# Patient Record
Sex: FEMALE | Race: WHITE | NOT HISPANIC OR LATINO | Employment: UNEMPLOYED | URBAN - METROPOLITAN AREA
[De-identification: names, ages, dates, MRNs, and addresses within clinical notes are randomized per-mention and may not be internally consistent; named-entity substitution may affect disease eponyms.]

---

## 2018-10-07 ENCOUNTER — HOSPITAL ENCOUNTER (EMERGENCY)
Facility: MEDICAL CENTER | Age: 23
End: 2018-10-07
Attending: EMERGENCY MEDICINE

## 2018-10-07 VITALS
SYSTOLIC BLOOD PRESSURE: 92 MMHG | TEMPERATURE: 97.6 F | DIASTOLIC BLOOD PRESSURE: 66 MMHG | WEIGHT: 102.51 LBS | RESPIRATION RATE: 16 BRPM | OXYGEN SATURATION: 98 % | HEART RATE: 68 BPM

## 2018-10-07 DIAGNOSIS — F41.0 PANIC ATTACK: ICD-10-CM

## 2018-10-07 PROCEDURE — 99284 EMERGENCY DEPT VISIT MOD MDM: CPT

## 2018-10-07 PROCEDURE — 96372 THER/PROPH/DIAG INJ SC/IM: CPT

## 2018-10-07 PROCEDURE — 700111 HCHG RX REV CODE 636 W/ 250 OVERRIDE (IP): Performed by: EMERGENCY MEDICINE

## 2018-10-07 RX ORDER — LORAZEPAM 2 MG/ML
1 INJECTION INTRAMUSCULAR
Status: DISCONTINUED | OUTPATIENT
Start: 2018-10-07 | End: 2018-10-07 | Stop reason: HOSPADM

## 2018-10-07 RX ORDER — LORAZEPAM 1 MG/1
1 TABLET ORAL EVERY 8 HOURS PRN
Qty: 15 TAB | Refills: 0 | Status: SHIPPED | OUTPATIENT
Start: 2018-10-07 | End: 2018-10-12

## 2018-10-07 RX ORDER — LORAZEPAM 2 MG/ML
1 INJECTION INTRAMUSCULAR ONCE
Status: DISCONTINUED | OUTPATIENT
Start: 2018-10-07 | End: 2018-10-07

## 2018-10-07 RX ADMIN — LORAZEPAM 1 MG: 2 INJECTION INTRAMUSCULAR; INTRAVENOUS at 10:36

## 2018-10-07 NOTE — ED NOTES
Pt verbalizes understanding of discharge and follow-up instructions. Given Rx X1.  VSS.  All questions answered.  Ambulates to discharge with steady gait.

## 2018-10-07 NOTE — ED NOTES
"Report from Ingrid DUNN. Pt resting in bed at this time. Reports that she feels, \"a little better\" after medication. Chart up for re-eval  "

## 2018-10-07 NOTE — ED NOTES
"Pt ambulated to room w/steady gait. Pt very anxious, crying telling RN that she is starting new job tomorrow w/c she thinks is making her feel anxious, normally gets ativan for her anxiety, told RN \"i don't want to die\". Comfort and reassurance given to pt. Chart up for erp to see.  "

## 2018-10-07 NOTE — ED TRIAGE NOTES
Chief Complaint   Patient presents with   • Anxiety   • Anorexia     Pt reports Hx anxiety. States that she is off her med[place] since [place]. Reports that she takes hydroxyzine, Neurontin, ativan, promethazine, clonidine.      Pt reports that she is starting a new job tomorrow, has been having extreme panic attacks.  Pt tearful on arrival.      Blood pressure 128/54, pulse (!) 115, temperature 36.4 °C (97.6 °F), resp. rate 18, weight 46.5 kg (102 lb 8.2 oz), last menstrual period 09/26/2018, SpO2 97 %.    Pt informed of wait times. Educated on triage process.  Asked to return to triage RN for any new or worsening of symptoms. Thanked for patience.

## 2018-10-07 NOTE — ED NOTES
Pt states that she is feeling much better. Pt now resting comfortably in bed and remains calm. No new complaints made.

## 2018-10-07 NOTE — ED NOTES
Does the patient present to the ED because of a fall? NO    Is the patient 70 yrs or older? NO     Does the patient have an altered mental status? NO     Does the patient have impaired mobility? NO     Does the nurse feel the patient is a fall risk due to bowel/bladder incontinence, diarrhea, urinary frequency/urgency, leg weakness, orthostatic hypotension, dizziness/vertigo, or narcotic use? NO     Yes to ANY = High fall risk  No to ANY= Low fall risk

## 2018-10-07 NOTE — ED NOTES
Pt medicated for anxiety per mar order. Allergies verified. Placed on pulse ox.  Will continue to monitor for relief.

## 2018-10-07 NOTE — ED PROVIDER NOTES
"ED Provider Note    Scribed for Nahun Mcgill M.D. by Billie Mendoza. 10/7/2018  10:29 AM    Means of arrival: walk in   History obtained from: patient   History limited by: none     CHIEF COMPLAINT  Chief Complaint   Patient presents with   • Anxiety   • Anorexia       HPI  Jayla Dee is a 23 y.o. female with a history of anxiety who presents to the Emergency Department for evaluation of anxiety which began 3 days ago. Patient reports associated anorexia and weight loss. Patient reports she normally takes Ativan for her anxiety but states \"she did not want to die\". Patient states she has recently ran out of her prescriptions and cannot get a new scripts as she is from [place] and is not established with any physicians in [place] yet. She is starting a new job tomorrow and is very anxious about this. Patient takes Hydroxyzine, Neurontin, Ativan, Promethazine, and Clonidine daily. No other acute medical complaints or concerns.     REVIEW OF SYSTEMS  Pertinent positives include anxiety, anorexia, weight loss.      PAST MEDICAL HISTORY   has a past medical history of Anxiety.    SURGICAL HISTORY  patient denies any surgical history    SOCIAL HISTORY  Social History   Substance Use Topics   • Smoking status: Current Every Day Smoker   • Smokeless tobacco: None noted    • Alcohol use No      History   Drug Use     Comment: marijuana       FAMILY HISTORY  History reviewed. No pertinent family history.    CURRENT MEDICATIONS  Home Medications     Reviewed by Ingrid Dumont R.N. (Registered Nurse) on 10/07/18 at 1027  Med List Status: Complete   Medication Last Dose Status        Patient Palmer Taking any Medications                       ALLERGIES  Allergies   Allergen Reactions   • Pcn [Penicillins]        PHYSICAL EXAM  VITAL SIGNS: /54   Pulse (!) 115   Temp 36.4 °C (97.6 °F)   Resp 18   Wt 46.5 kg (102 lb 8.2 oz)   LMP 09/26/2018 (Approximate)   SpO2 97%     Vital signs reviewed.  Constitutional:  Tearful " and anxious.   Head: Normocephalic.   Mouth/Throat: Oropharynx is clear and moist.   Eyes: EOM are normal. Pupils are equal, round, and reactive to light.   Cardiovascular: Normal rate, regular rhythm and normal heart sounds.    Pulmonary/Chest: Effort normal and breath sounds normal. No wheezes.   Abdominal: Soft. There is no tenderness. There is no rebound and no guarding.   Neurological: Patient is alert and oriented to person, place, and time. CNs II - XII intact. DTRs intact. Normal sensation and strength. Tremulous.   Skin: Skin is warm and dry.   Psychiatric: Tearful and anxious    COURSE & MEDICAL DECISION MAKING  Pertinent Labs & Imaging studies reviewed. (See chart for details) The patient's [facility] and past medical  records were reviewed    10:29 AM - Patient seen and examined at bedside. Patient will be treated with Ativan 1 mg. The differential diagnoses include but are not limited to: Anxiety attack .     12:19 PM- Patient rechecked at bedside. She is feeling improved. The patient was updated on diagnostic test results as seen above. The patient will be discharged, status improved, with instructions regarding supportive care and with a prescription for Ativan. Instructions were given for follow-up. Discussed indications for seeking immediate medical attention. Patient was given the opportunity for questions. The patient understands and agrees.     I reviewed prescription monitoring program for patient's narcotic use before prescribing a scheduled drug.The patient will not drink alcohol nor drive with prescribed medications. The patient will return for new or worsening symptoms and is stable at the time of discharge.    In prescribing controlled substances to this patient, I certify that I have obtained and reviewed the medical history of Jalya Dee. I have also made a good ellis effort to obtain applicable records from other providers who have treated the patient and records did not  demonstrate any increased risk of substance abuse that would prevent me from prescribing controlled substances.     I have conducted a physical exam and documented it. I have reviewed Ms. Dee’s prescription history as maintained by the [place] Prescription Monitoring Program.     I have assessed the patient’s risk for abuse, dependency, and addiction using the validated Opioid Risk Tool available at https://www.mdcalc.com/ftnhzv-lcrq-dfpp-ort-narcotic-abuse.     Given the above, I believe the benefits of controlled substance therapy outweigh the risks. The reasons for prescribing controlled substances include in my professional opinion, controlled substances are the only reasonable choice for this patient because the patient takes Ativan daily and needs a refill on her prescription.  Patient was given 5 days worth.  I told her we would not refill a second prescription.  She states that she plans to follow up in [facility] to see her primary doctor accordingly, I have discussed the risk and benefits, treatment plan, and alternative therapies with the patient.     DISPOSITION:  Patient will be discharged home in stable condition.    FOLLOW UP:  your doctor    In 3 days  for your medication needs      OUTPATIENT MEDICATIONS:  New Prescriptions    No medications on file     FINAL IMPRESSION  1. Panic attack        Billie FINLEY (Dnaiel), am scribing for, and in the presence of, Nauhn Mcgill M.D..    Electronically signed by: Billie Mendoza (Daniel), 10/7/2018    Nahun FINLEY M.D. personally performed the services described in this documentation, as scribed by Billie Mendoza in my presence, and it is both accurate and complete. E.     The note accurately reflects work and decisions made by me.  Nahun Mcgill  10/7/2018  1:45 PM

## 2018-10-10 ENCOUNTER — HOSPITAL ENCOUNTER (EMERGENCY)
Facility: MEDICAL CENTER | Age: 23
End: 2018-10-10
Attending: EMERGENCY MEDICINE

## 2018-10-10 VITALS
HEART RATE: 118 BPM | WEIGHT: 101.19 LBS | TEMPERATURE: 97.8 F | OXYGEN SATURATION: 97 % | SYSTOLIC BLOOD PRESSURE: 125 MMHG | RESPIRATION RATE: 17 BRPM | HEIGHT: 63 IN | DIASTOLIC BLOOD PRESSURE: 75 MMHG | BODY MASS INDEX: 17.93 KG/M2

## 2018-10-10 DIAGNOSIS — F41.9 ANXIETY: ICD-10-CM

## 2018-10-10 PROCEDURE — 96372 THER/PROPH/DIAG INJ SC/IM: CPT

## 2018-10-10 PROCEDURE — 99284 EMERGENCY DEPT VISIT MOD MDM: CPT

## 2018-10-10 PROCEDURE — 90791 PSYCH DIAGNOSTIC EVALUATION: CPT

## 2018-10-10 PROCEDURE — 700111 HCHG RX REV CODE 636 W/ 250 OVERRIDE (IP): Performed by: EMERGENCY MEDICINE

## 2018-10-10 RX ORDER — LORAZEPAM 2 MG/ML
1 INJECTION INTRAMUSCULAR ONCE
Status: COMPLETED | OUTPATIENT
Start: 2018-10-10 | End: 2018-10-10

## 2018-10-10 RX ORDER — GABAPENTIN 300 MG/1
300 CAPSULE ORAL 2 TIMES DAILY
COMMUNITY

## 2018-10-10 RX ADMIN — LORAZEPAM 1 MG: 2 INJECTION INTRAMUSCULAR; INTRAVENOUS at 14:30

## 2018-10-10 NOTE — ED PROVIDER NOTES
"ED Provider Note    CHIEF COMPLAINT  Chief Complaint   Patient presents with   • Anxiety   • Panic Attack       HPI  Jayla Dee is a 23 y.o. female who presents with anxiety.  Patient was seen recently in the ER with anxiety.  Her symptoms started about a week ago.  She is worried about getting evicted from her home. Going home to where her roommate is causes her extreme anxiety.  This results in nausea weakness tremulousness.  No specific pain.  No chest pain or shortness of breath.  She has not had a fever.  She is not pregnant.  No dysuria hematuria frequency.  When the patient was here in the ER she received a prescription for Ativan.  She still has this Ativan and did not take any, but she believes she needs an injection because she is so anxious.    She has not been hearing voices.  No suicidal or homicidal thoughts.  No thoughts to cut herself.    REVIEW OF SYSTEMS  Pertinent negative: As above    PAST MEDICAL HISTORY  Borderline personality, anxiety, cutting    SOCIAL HISTORY  Social History   Substance Use Topics   • Smoking status: Current Every Day Smoker     Packs/day: 0.50     Types: Cigarettes   • Smokeless tobacco: Never Used   • Alcohol use No       SURGICAL HISTORY  History reviewed. No pertinent surgical history.    ALLERGIES  Allergies   Allergen Reactions   • Pcn [Penicillins]        PHYSICAL EXAM  VITAL SIGNS: /68   Pulse 88   Temp 36.7 °C (98 °F)   Resp 16   Ht 1.6 m (5' 3\")   Wt 45.9 kg (101 lb 3.1 oz)   LMP 09/26/2018 (Approximate)   SpO2 97%   BMI 17.93 kg/m²    Constitutional: Awake and alert. Nontoxic  HENT:  Grossly normal  Eyes: Grossly normal  Neck: Normal range of motion  Cardiovascular: Normal heart rate   Thorax & Lungs: No respiratory distress  Abdomen: Nontender  Skin:  No pathologic rash.   Extremities: Well perfused  Psychiatric: Anxious.  Tearful      COURSE & MEDICAL DECISION MAKING  Patient presents with acute anxiety reaction.  She was given IM Ativan.  She " settled down rapidly.  She has no medical complaints or findings on her examination.  Her vital signs are stable.  [employer] evaluated the patient.  The patient again confirms she is not suicidal or homicidal.  She is not hearing voices.  She is just anxious.  There is no indication for legal hold.    The patient's roommate was contacted.  Patient is not felt to be in danger.  Appointment has been made for the patient tomorrow for further evaluation.  She has been encouraged to keep this appointment.  She will return the ER if she has any suicidal thoughts, hearing voices or concern.    FINAL IMPRESSION  1.  Acute anxiety reaction      Disposition: home in good condition      This dictation was created using voice recognition software. The accuracy of the dictation is limited to the abilities of the software.  The nursing notes were reviewed and certain aspects of this information were incorporated into this note.      Electronically signed by: Louis Myrick, 10/10/2018 4:59 PM
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
## 2018-10-10 NOTE — ED NOTES
Pt roomed. Attempted to speak with pt regarding her visit. Pt is crying and screaming at her friends via her phone. It is difficult to understand pt needs at this time.

## 2018-10-10 NOTE — ED NOTES
Pt has 6 ativan pills at bedside but wants a injection of ativan because it works better per [facility]
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
## 2018-10-11 NOTE — DISCHARGE PLANNING
"Discussed plan for discharge.  States she is afraid her roommate is going to evict her.  She signed a BRIANNA to talk to her roommate Aaron who agreed to take her back and pick her up if she would get some help.  She became hysterical saying he is going to kick me out and I have a dog and cannot be on the street.  Yelling and unable to talk about the solution of going to get treatment.  States her roommate thinks she is a drug addict and she yelled: \"I am not a drug addict!\".  Told this writer to get the fu__ out of her room.  Resources given for [facility].   "

## 2018-12-26 ENCOUNTER — HOSPITAL ENCOUNTER (INPATIENT)
Facility: HOSPITAL | Age: 23
LOS: 2 days | Discharge: HOME | DRG: 370 | End: 2018-12-28
Attending: EMERGENCY MEDICINE | Admitting: INTERNAL MEDICINE
Payer: COMMERCIAL

## 2018-12-26 ENCOUNTER — APPOINTMENT (EMERGENCY)
Dept: CT IMAGING | Facility: HOSPITAL | Age: 23
DRG: 370 | End: 2018-12-26
Payer: COMMERCIAL

## 2018-12-26 ENCOUNTER — APPOINTMENT (EMERGENCY)
Dept: RADIOLOGY | Facility: HOSPITAL | Age: 23
DRG: 370 | End: 2018-12-26
Payer: COMMERCIAL

## 2018-12-26 DIAGNOSIS — F32.A DEPRESSION: ICD-10-CM

## 2018-12-26 DIAGNOSIS — IMO0002 H/O SELF-HARM: ICD-10-CM

## 2018-12-26 DIAGNOSIS — R10.9 ABDOMINAL PAIN: ICD-10-CM

## 2018-12-26 DIAGNOSIS — F31.9 BIPOLAR DISORDER (HCC): ICD-10-CM

## 2018-12-26 DIAGNOSIS — K92.1 MELENA: ICD-10-CM

## 2018-12-26 DIAGNOSIS — K29.21 ACUTE ALCOHOLIC GASTRITIS WITH HEMORRHAGE: Primary | ICD-10-CM

## 2018-12-26 PROBLEM — Z00.8 ENCOUNTER FOR PSYCHOLOGICAL EVALUATION: Status: ACTIVE | Noted: 2018-12-26

## 2018-12-26 LAB
ALBUMIN SERPL BCP-MCNC: 3.8 G/DL (ref 3.5–5)
ALP SERPL-CCNC: 93 U/L (ref 46–116)
ALT SERPL W P-5'-P-CCNC: 22 U/L (ref 12–78)
AMORPH PHOS CRY URNS QL MICRO: ABNORMAL /HPF
AMPHETAMINES SERPL QL SCN: NEGATIVE
ANION GAP SERPL CALCULATED.3IONS-SCNC: 13 MMOL/L (ref 4–13)
AST SERPL W P-5'-P-CCNC: 25 U/L (ref 5–45)
BACTERIA UR QL AUTO: ABNORMAL /HPF
BARBITURATES UR QL: NEGATIVE
BASOPHILS # BLD AUTO: 0.07 THOUSANDS/ΜL (ref 0–0.1)
BASOPHILS NFR BLD AUTO: 0 % (ref 0–1)
BENZODIAZ UR QL: POSITIVE
BILIRUB SERPL-MCNC: 0.3 MG/DL (ref 0.2–1)
BILIRUB UR QL STRIP: NEGATIVE
BUN SERPL-MCNC: 6 MG/DL (ref 5–25)
CALCIUM SERPL-MCNC: 8.7 MG/DL (ref 8.3–10.1)
CHLORIDE SERPL-SCNC: 105 MMOL/L (ref 100–108)
CLARITY UR: ABNORMAL
CO2 SERPL-SCNC: 24 MMOL/L (ref 21–32)
COCAINE UR QL: NEGATIVE
COLOR UR: YELLOW
CREAT SERPL-MCNC: 0.55 MG/DL (ref 0.6–1.3)
EOSINOPHIL # BLD AUTO: 0.11 THOUSAND/ΜL (ref 0–0.61)
EOSINOPHIL NFR BLD AUTO: 1 % (ref 0–6)
ERYTHROCYTE [DISTWIDTH] IN BLOOD BY AUTOMATED COUNT: 15.1 % (ref 11.6–15.1)
ETHANOL EXG-MCNC: 0 MG/DL
EXT PREG TEST URINE: NORMAL
GFR SERPL CREATININE-BSD FRML MDRD: 133 ML/MIN/1.73SQ M
GLUCOSE SERPL-MCNC: 80 MG/DL (ref 65–140)
GLUCOSE UR STRIP-MCNC: NEGATIVE MG/DL
HCT VFR BLD AUTO: 38.3 % (ref 34.8–46.1)
HGB BLD-MCNC: 12.8 G/DL (ref 11.5–15.4)
HGB UR QL STRIP.AUTO: ABNORMAL
IMM GRANULOCYTES # BLD AUTO: 0.09 THOUSAND/UL (ref 0–0.2)
IMM GRANULOCYTES NFR BLD AUTO: 1 % (ref 0–2)
KETONES UR STRIP-MCNC: NEGATIVE MG/DL
LEUKOCYTE ESTERASE UR QL STRIP: NEGATIVE
LIPASE SERPL-CCNC: 102 U/L (ref 73–393)
LYMPHOCYTES # BLD AUTO: 1.82 THOUSANDS/ΜL (ref 0.6–4.47)
LYMPHOCYTES NFR BLD AUTO: 11 % (ref 14–44)
MCH RBC QN AUTO: 29.7 PG (ref 26.8–34.3)
MCHC RBC AUTO-ENTMCNC: 33.4 G/DL (ref 31.4–37.4)
MCV RBC AUTO: 89 FL (ref 82–98)
METHADONE UR QL: NEGATIVE
MONOCYTES # BLD AUTO: 1.24 THOUSAND/ΜL (ref 0.17–1.22)
MONOCYTES NFR BLD AUTO: 8 % (ref 4–12)
NEUTROPHILS # BLD AUTO: 12.76 THOUSANDS/ΜL (ref 1.85–7.62)
NEUTS SEG NFR BLD AUTO: 79 % (ref 43–75)
NITRITE UR QL STRIP: NEGATIVE
NON-SQ EPI CELLS URNS QL MICRO: ABNORMAL /HPF
NRBC BLD AUTO-RTO: 0 /100 WBCS
OPIATES UR QL SCN: NEGATIVE
PCP UR QL: NEGATIVE
PH UR STRIP.AUTO: 8.5 [PH] (ref 4.5–8)
PLATELET # BLD AUTO: 303 THOUSANDS/UL (ref 149–390)
PMV BLD AUTO: 8.5 FL (ref 8.9–12.7)
POTASSIUM SERPL-SCNC: 3.8 MMOL/L (ref 3.5–5.3)
PROT SERPL-MCNC: 7.4 G/DL (ref 6.4–8.2)
PROT UR STRIP-MCNC: ABNORMAL MG/DL
RBC # BLD AUTO: 4.31 MILLION/UL (ref 3.81–5.12)
RBC #/AREA URNS AUTO: ABNORMAL /HPF
SODIUM SERPL-SCNC: 142 MMOL/L (ref 136–145)
SP GR UR STRIP.AUTO: 1.02 (ref 1–1.03)
THC UR QL: POSITIVE
UROBILINOGEN UR QL STRIP.AUTO: 0.2 E.U./DL
WBC # BLD AUTO: 16.09 THOUSAND/UL (ref 4.31–10.16)
WBC #/AREA URNS AUTO: ABNORMAL /HPF

## 2018-12-26 PROCEDURE — 90715 TDAP VACCINE 7 YRS/> IM: CPT | Performed by: EMERGENCY MEDICINE

## 2018-12-26 PROCEDURE — 85025 COMPLETE CBC W/AUTO DIFF WBC: CPT | Performed by: EMERGENCY MEDICINE

## 2018-12-26 PROCEDURE — 96365 THER/PROPH/DIAG IV INF INIT: CPT

## 2018-12-26 PROCEDURE — 83690 ASSAY OF LIPASE: CPT | Performed by: EMERGENCY MEDICINE

## 2018-12-26 PROCEDURE — 94760 N-INVAS EAR/PLS OXIMETRY 1: CPT

## 2018-12-26 PROCEDURE — C9113 INJ PANTOPRAZOLE SODIUM, VIA: HCPCS | Performed by: EMERGENCY MEDICINE

## 2018-12-26 PROCEDURE — 94762 N-INVAS EAR/PLS OXIMTRY CONT: CPT

## 2018-12-26 PROCEDURE — 82075 ASSAY OF BREATH ETHANOL: CPT | Performed by: EMERGENCY MEDICINE

## 2018-12-26 PROCEDURE — 90471 IMMUNIZATION ADMIN: CPT

## 2018-12-26 PROCEDURE — 74177 CT ABD & PELVIS W/CONTRAST: CPT

## 2018-12-26 PROCEDURE — 96376 TX/PRO/DX INJ SAME DRUG ADON: CPT

## 2018-12-26 PROCEDURE — 80053 COMPREHEN METABOLIC PANEL: CPT | Performed by: EMERGENCY MEDICINE

## 2018-12-26 PROCEDURE — 96361 HYDRATE IV INFUSION ADD-ON: CPT

## 2018-12-26 PROCEDURE — 93005 ELECTROCARDIOGRAM TRACING: CPT

## 2018-12-26 PROCEDURE — 80307 DRUG TEST PRSMV CHEM ANLYZR: CPT | Performed by: EMERGENCY MEDICINE

## 2018-12-26 PROCEDURE — 71046 X-RAY EXAM CHEST 2 VIEWS: CPT

## 2018-12-26 PROCEDURE — C9113 INJ PANTOPRAZOLE SODIUM, VIA: HCPCS | Performed by: INTERNAL MEDICINE

## 2018-12-26 PROCEDURE — 99285 EMERGENCY DEPT VISIT HI MDM: CPT

## 2018-12-26 PROCEDURE — 96366 THER/PROPH/DIAG IV INF ADDON: CPT

## 2018-12-26 PROCEDURE — 81001 URINALYSIS AUTO W/SCOPE: CPT

## 2018-12-26 PROCEDURE — 36415 COLL VENOUS BLD VENIPUNCTURE: CPT | Performed by: EMERGENCY MEDICINE

## 2018-12-26 PROCEDURE — 99223 1ST HOSP IP/OBS HIGH 75: CPT | Performed by: INTERNAL MEDICINE

## 2018-12-26 PROCEDURE — 81025 URINE PREGNANCY TEST: CPT | Performed by: EMERGENCY MEDICINE

## 2018-12-26 RX ORDER — DICYCLOMINE HYDROCHLORIDE 10 MG/1
10 CAPSULE ORAL
Status: DISCONTINUED | OUTPATIENT
Start: 2018-12-26 | End: 2018-12-28 | Stop reason: HOSPADM

## 2018-12-26 RX ORDER — PANTOPRAZOLE SODIUM 40 MG/1
40 INJECTION, POWDER, FOR SOLUTION INTRAVENOUS ONCE
Status: COMPLETED | OUTPATIENT
Start: 2018-12-26 | End: 2018-12-26

## 2018-12-26 RX ORDER — CLONIDINE HYDROCHLORIDE 0.2 MG/1
0.2 TABLET ORAL 2 TIMES DAILY
Status: ON HOLD | COMMUNITY
End: 2018-12-28

## 2018-12-26 RX ORDER — CLONIDINE HYDROCHLORIDE 0.1 MG/1
0.2 TABLET ORAL ONCE
Status: COMPLETED | OUTPATIENT
Start: 2018-12-26 | End: 2018-12-26

## 2018-12-26 RX ORDER — GABAPENTIN 600 MG/1
600 TABLET ORAL
COMMUNITY

## 2018-12-26 RX ORDER — GABAPENTIN 300 MG/1
600 CAPSULE ORAL
Status: DISCONTINUED | OUTPATIENT
Start: 2018-12-26 | End: 2018-12-28 | Stop reason: HOSPADM

## 2018-12-26 RX ORDER — LORAZEPAM 1 MG/1
1 TABLET ORAL ONCE
Status: COMPLETED | OUTPATIENT
Start: 2018-12-26 | End: 2018-12-26

## 2018-12-26 RX ORDER — ACETAMINOPHEN 325 MG/1
650 TABLET ORAL EVERY 6 HOURS PRN
Status: DISCONTINUED | OUTPATIENT
Start: 2018-12-26 | End: 2018-12-28 | Stop reason: HOSPADM

## 2018-12-26 RX ORDER — LORAZEPAM 1 MG/1
2 TABLET ORAL ONCE
Status: COMPLETED | OUTPATIENT
Start: 2018-12-26 | End: 2018-12-26

## 2018-12-26 RX ORDER — ONDANSETRON 4 MG/1
4 TABLET, ORALLY DISINTEGRATING ORAL ONCE
Status: COMPLETED | OUTPATIENT
Start: 2018-12-26 | End: 2018-12-26

## 2018-12-26 RX ORDER — GABAPENTIN 300 MG/1
300 CAPSULE ORAL DAILY
Status: DISCONTINUED | OUTPATIENT
Start: 2018-12-27 | End: 2018-12-28 | Stop reason: HOSPADM

## 2018-12-26 RX ORDER — DOCUSATE SODIUM 100 MG/1
100 CAPSULE, LIQUID FILLED ORAL 2 TIMES DAILY PRN
Status: DISCONTINUED | OUTPATIENT
Start: 2018-12-26 | End: 2018-12-28 | Stop reason: HOSPADM

## 2018-12-26 RX ORDER — HYDROXYZINE HYDROCHLORIDE 25 MG/1
100 TABLET, FILM COATED ORAL EVERY 6 HOURS PRN
Status: DISCONTINUED | OUTPATIENT
Start: 2018-12-26 | End: 2018-12-28 | Stop reason: HOSPADM

## 2018-12-26 RX ORDER — GABAPENTIN 300 MG/1
300 CAPSULE ORAL DAILY
Status: ON HOLD | COMMUNITY
End: 2018-12-28

## 2018-12-26 RX ORDER — ONDANSETRON 2 MG/ML
4 INJECTION INTRAMUSCULAR; INTRAVENOUS EVERY 6 HOURS PRN
Status: DISCONTINUED | OUTPATIENT
Start: 2018-12-26 | End: 2018-12-28 | Stop reason: HOSPADM

## 2018-12-26 RX ORDER — CLONIDINE HYDROCHLORIDE 0.1 MG/1
0.2 TABLET ORAL 2 TIMES DAILY
Status: DISCONTINUED | OUTPATIENT
Start: 2018-12-26 | End: 2018-12-28 | Stop reason: HOSPADM

## 2018-12-26 RX ORDER — PANTOPRAZOLE SODIUM 40 MG/1
40 INJECTION, POWDER, FOR SOLUTION INTRAVENOUS EVERY 12 HOURS SCHEDULED
Status: DISCONTINUED | OUTPATIENT
Start: 2018-12-26 | End: 2018-12-28

## 2018-12-26 RX ORDER — DEXTROSE AND SODIUM CHLORIDE 5; .9 G/100ML; G/100ML
75 INJECTION, SOLUTION INTRAVENOUS CONTINUOUS
Status: DISCONTINUED | OUTPATIENT
Start: 2018-12-26 | End: 2018-12-28

## 2018-12-26 RX ORDER — KETOROLAC TROMETHAMINE 30 MG/ML
15 INJECTION, SOLUTION INTRAMUSCULAR; INTRAVENOUS ONCE
Status: COMPLETED | OUTPATIENT
Start: 2018-12-26 | End: 2018-12-26

## 2018-12-26 RX ORDER — CALCIUM CARBONATE 200(500)MG
1000 TABLET,CHEWABLE ORAL DAILY PRN
Status: DISCONTINUED | OUTPATIENT
Start: 2018-12-26 | End: 2018-12-28 | Stop reason: HOSPADM

## 2018-12-26 RX ORDER — HYDROXYZINE PAMOATE 100 MG/1
100 CAPSULE ORAL 4 TIMES DAILY PRN
COMMUNITY
End: 2019-01-18 | Stop reason: SDUPTHER

## 2018-12-26 RX ADMIN — LORAZEPAM 2 MG: 1 TABLET ORAL at 21:13

## 2018-12-26 RX ADMIN — SODIUM CHLORIDE 8 MG/HR: 9 INJECTION, SOLUTION INTRAVENOUS at 16:26

## 2018-12-26 RX ADMIN — CLONIDINE HYDROCHLORIDE 0.2 MG: 0.1 TABLET ORAL at 14:47

## 2018-12-26 RX ADMIN — PANTOPRAZOLE SODIUM 40 MG: 40 INJECTION, POWDER, FOR SOLUTION INTRAVENOUS at 16:22

## 2018-12-26 RX ADMIN — TETANUS TOXOID, REDUCED DIPHTHERIA TOXOID AND ACELLULAR PERTUSSIS VACCINE, ADSORBED 0.5 ML: 5; 2.5; 8; 8; 2.5 SUSPENSION INTRAMUSCULAR at 15:34

## 2018-12-26 RX ADMIN — ONDANSETRON 4 MG: 4 TABLET, ORALLY DISINTEGRATING ORAL at 14:47

## 2018-12-26 RX ADMIN — PANTOPRAZOLE SODIUM 40 MG: 40 INJECTION, POWDER, FOR SOLUTION INTRAVENOUS at 21:15

## 2018-12-26 RX ADMIN — DICYCLOMINE HYDROCHLORIDE 10 MG: 10 CAPSULE ORAL at 21:15

## 2018-12-26 RX ADMIN — SODIUM CHLORIDE 1000 ML: 0.9 INJECTION, SOLUTION INTRAVENOUS at 14:31

## 2018-12-26 RX ADMIN — MORPHINE SULFATE 2 MG: 2 INJECTION, SOLUTION INTRAMUSCULAR; INTRAVENOUS at 22:13

## 2018-12-26 RX ADMIN — KETOROLAC TROMETHAMINE 15 MG: 30 INJECTION, SOLUTION INTRAMUSCULAR at 20:08

## 2018-12-26 RX ADMIN — ONDANSETRON 4 MG: 2 INJECTION INTRAMUSCULAR; INTRAVENOUS at 21:15

## 2018-12-26 RX ADMIN — GABAPENTIN 600 MG: 300 CAPSULE ORAL at 21:16

## 2018-12-26 RX ADMIN — DEXTROSE AND SODIUM CHLORIDE 100 ML/HR: 5; .9 INJECTION, SOLUTION INTRAVENOUS at 20:08

## 2018-12-26 RX ADMIN — LORAZEPAM 1 MG: 1 TABLET ORAL at 17:18

## 2018-12-26 RX ADMIN — IOHEXOL 100 ML: 350 INJECTION, SOLUTION INTRAVENOUS at 16:15
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
## 2018-12-26 NOTE — ED NOTES
Pt ate Nicole's brought by significant other after Dr Gian Lobato and I informed her not to  Dr Gian Lobato is aware       Johnny Kearney RN  12/26/18 [id number]

## 2018-12-26 NOTE — ED PROVIDER NOTES
History  Chief Complaint   Patient presents with    Abdominal Pain     pt here with c/o severe abd pain  states she may have had alcohol poisoning  onset yesterday  also reports black, tarry stool   Psychiatric Evaluation     pt ran out of her [employer] meds for a few days now  denies any thoughts of SI or HI       HPI     80-year-old female with history of depression and anxiety on gabapentin, hydroxyzine, and clonidine, anemia, asthma, bipolar disorder, interstitial cystitis, who presents for evaluation of abdominal pain and vomiting  Patient states that she went on a drinking binge yesterday, drinking 24 shots of liquor and taking 1 mg of Xanax that is not prescribed to her  Last alcoholic beverage was at 1:91 p m  last night  States that at around 4:00 p m  she began to experience abdominal pain, present throughout the lower abdomen and epigastrium  Described as a burning sensation, she started vomiting this morning, has vomited numerous times since then  Has noted some red streaking in her vomitus, no coffee-ground material   This morning she had multiple soft black stools, and is concerned that I am bleeding internally    Denies history of GI bleed  Denies fevers or chills  No dysuria or frequency  Patient does note yellow vaginal discharge and has a history of Chlamydia that was last treated a year ago  She is currently menstruating  Abdominal pain is described as burning, severe, nonradiating, present constantly with no aggravating or alleviating factors  No fevers or chills  Additionally, patient also endorses worsening depression over the last few days, aggravated by the loss of her grandfather over the holidays  She cut her right forearm yesterday, used a piece of glass and a razor  States that she did this not with an intent to kill herself but just to cause myself pain    She tells me that she did not drink heavily yesterday with the intent to kill herself, but did "want to make the pain go away "  She has been hospitalized for inpatient psychiatry treatment in the past, most recently 1 year ago in [place]  She ran out of her clonidine 3 days ago and feels that this is contributing to her anxiety  States that she feels tremulous and extremely anxious  She denies suicidal ideation, homicidal ideation, or audiovisual hallucinations  Drinks a "medium sized" bottle of liquor daily for the last several weeks  Additionally, patient endorses intermittent sharp chest pain that occurs on a momentary basis, has been going on for the past 6 months, occurs and then quickly resolves  Denies shortness of breath  Occurs without warning, lasts only for a couple seconds, does not radiate  Denies history of DVT or PE  No calf swelling or tenderness  No prolonged immobilization  Prior to Admission Medications   Prescriptions Last Dose Informant Patient Reported? Taking? cloNIDine (CATAPRES) 0 2 mg tablet   Yes Yes   Sig: Take 0 2 mg by mouth 2 (two) times a day   gabapentin (NEURONTIN) 300 mg capsule   Yes Yes   Sig: Take 300 mg by mouth daily   gabapentin (NEURONTIN) 600 MG tablet   Yes Yes   Sig: Take 600 mg by mouth daily at bedtime   hydrOXYzine (VISTARIL) 100 MG capsule   Yes Yes   Sig: Take 100 mg by mouth 4 (four) times a day as needed for itching      Facility-Administered Medications: None       Past Medical History:   Diagnosis Date    Anemia     Anxiety     Asthma     Bipolar 1 disorder (HCC)     Depression     IC (interstitial cystitis)        History reviewed  No pertinent surgical history  History reviewed  No pertinent family history  I have reviewed and agree with the history as documented  Social History   Substance Use Topics    Smoking status: Current Every Day Smoker    Smokeless tobacco: Never Used    Alcohol use Yes        Review of Systems   Constitutional: Negative for chills and fever  HENT: Negative for congestion      Eyes: Negative for visual disturbance  Respiratory: Negative for cough and shortness of breath  Cardiovascular: Positive for chest pain (momentary, not present currently)  Negative for palpitations and leg swelling  Gastrointestinal: Positive for abdominal pain, nausea and vomiting  Negative for constipation and diarrhea  Black stools   Genitourinary: Positive for pelvic pain and vaginal discharge  Negative for dysuria, frequency, hematuria and vaginal pain  Musculoskeletal: Negative for arthralgias, back pain, neck pain and neck stiffness  Skin: Positive for wound (self-inflicted to R forearm)  Negative for rash  Neurological: Negative for weakness, numbness and headaches  Psychiatric/Behavioral: Positive for dysphoric mood and self-injury  Negative for agitation, behavioral problems, confusion, hallucinations and suicidal ideas  The patient is nervous/anxious  Physical Exam  Physical Exam   Constitutional: She is oriented to person, place, and time  She appears well-developed and well-nourished  No distress  HENT:   Head: Normocephalic and atraumatic  Right Ear: External ear normal    Left Ear: External ear normal    Nose: Nose normal    Dry mucous membranes   Eyes: Conjunctivae are normal    Neck: Normal range of motion  Neck supple  Cardiovascular: Normal rate, regular rhythm, normal heart sounds and intact distal pulses  Exam reveals no gallop and no friction rub  No murmur heard  Pulmonary/Chest: Effort normal and breath sounds normal  No respiratory distress  She has no wheezes  She has no rales  She exhibits no tenderness  Abdominal: Soft  Bowel sounds are normal  She exhibits no distension  There is tenderness (diffuse, worse over the epigastrium  Voluntary guarding, no peritonitis)  Genitourinary: Uterus normal  Rectal exam shows guaiac positive stool (Black stool in the rectal vault)   Rectal exam shows no external hemorrhoid, no internal hemorrhoid and anal tone normal  There is no rash or lesion on the right labia  There is no rash or lesion on the left labia  Uterus is not deviated, not enlarged and not tender  Cervix exhibits no motion tenderness and no friability  Right adnexum displays no mass, no tenderness and no fullness  Left adnexum displays no mass, no tenderness and no fullness  There is bleeding (moderate dark menstral blood in the vaginal vault) in the vagina  No vaginal discharge found  Musculoskeletal: Normal range of motion  She exhibits no edema (No calf swelling or tenderness) or deformity  Neurological: She is alert and oriented to person, place, and time  She exhibits normal muscle tone  Skin: Skin is warm and dry  She is not diaphoretic  Numerous superficial lacerations to the R forearm, healing, no erythema or drainage  Psychiatric:   Dysphoric mood, anxious, denies SI, HI, or AVH          Vital Signs  ED Triage Vitals [12/26/18 1255]   Temperature Pulse Respirations Blood Pressure SpO2   98 2 °F (36 8 °C) 91 18 149/72 100 %      Temp Source Heart Rate Source Patient Position - Orthostatic VS BP Location FiO2 (%)   Oral Monitor Sitting Left arm --      Pain Score       Worst Possible Pain           Vitals:    12/26/18 1937 12/26/18 2218 12/27/18 0257 12/27/18 0811   BP: 106/60 99/56 109/64 123/68   Pulse: 80 86 76 78   Patient Position - Orthostatic VS: Lying Lying Lying Lying       Visual Acuity      ED Medications  Medications   cloNIDine (CATAPRES) tablet 0 2 mg (0 2 mg Oral Given 12/27/18 0817)   gabapentin (NEURONTIN) capsule 300 mg (300 mg Oral Given 12/27/18 0817)   gabapentin (NEURONTIN) capsule 600 mg (600 mg Oral Given 12/26/18 2116)   hydrOXYzine HCL (ATARAX) tablet 100 mg (not administered)   docusate sodium (COLACE) capsule 100 mg (not administered)   ondansetron (ZOFRAN) injection 4 mg (4 mg Intravenous Given 12/26/18 2115)   calcium carbonate (TUMS) chewable tablet 1,000 mg (not administered)   nicotine (NICODERM CQ) 7 mg/24hr TD 24 hr patch 1 patch (1 patch Transdermal Medication Applied 12/27/18 0818)   pantoprazole (PROTONIX) injection 40 mg (40 mg Intravenous Given 12/27/18 0818)   dicyclomine (BENTYL) capsule 10 mg (10 mg Oral Given 12/27/18 0605)   dextrose 5 % and sodium chloride 0 9 % infusion (100 mL/hr Intravenous New Bag 12/27/18 0610)   morphine injection 2 mg (2 mg Intravenous Given 12/27/18 0534)   acetaminophen (TYLENOL) tablet 650 mg (not administered)   guaiFENesin (MUCINEX) 12 hr tablet 600 mg (600 mg Oral Given 12/27/18 1001)   LORazepam (ATIVAN) 2 mg/mL injection 0 5 mg (not administered)   albuterol (PROVENTIL HFA,VENTOLIN HFA) inhaler 2 puff (not administered)   cloNIDine (CATAPRES) tablet 0 2 mg (0 2 mg Oral Given 12/26/18 1447)   sodium chloride 0 9 % bolus 1,000 mL (0 mL Intravenous Stopped 12/26/18 1631)   ondansetron (ZOFRAN-ODT) dispersible tablet 4 mg (4 mg Oral Given 12/26/18 1447)   tetanus-diphtheria-acellular pertussis (BOOSTRIX) IM injection 0 5 mL (0 5 mL Intramuscular Given 12/26/18 1534)   pantoprazole (PROTONIX) injection 40 mg (40 mg Intravenous Given 12/26/18 1622)   iohexol (OMNIPAQUE) 350 MG/ML injection (MULTI-DOSE) 100 mL (100 mL Intravenous Given 12/26/18 1615)   LORazepam (ATIVAN) tablet 1 mg (1 mg Oral Given 12/26/18 1718)   ketorolac (TORADOL) injection 15 mg (15 mg Intravenous Given 12/26/18 2008)   LORazepam (ATIVAN) tablet 2 mg (2 mg Oral Given 12/26/18 2113)       Diagnostic Studies  Results Reviewed     Procedure Component Value Units Date/Time    Urine Microscopic [[id number]]  (Abnormal) Collected:  12/26/18 1548    Lab Status:  Final result Specimen:  Urine from Urine, Clean Catch Updated:  12/26/18 1619     RBC, UA 4-10 (A) /hpf      WBC, UA 0-1 (A) /hpf      Epithelial Cells Occasional /hpf      Bacteria, UA Innumerable (A) /hpf      AMORPH PHOSPATES Moderate /hpf     Rapid drug screen, urine [[id number]]  (Abnormal) Collected:  12/26/18 6443    Lab Status:  Final result Specimen:  Urine from Urine, Clean Catch Updated:  12/26/18 1608     Amph/Meth UR Negative     Barbiturate Ur Negative     Benzodiazepine Urine Positive (A)     Cocaine Urine Negative     Methadone Urine Negative     Opiate Urine Negative     PCP Ur Negative     THC Urine Positive (A)    Narrative:         Presumptive report  If requested, specimen will be sent to reference lab for confirmation  FOR MEDICAL PURPOSES ONLY  IF CONFIRMATION NEEDED PLEASE CONTACT THE LAB WITHIN 5 DAYS  Drug Screen Cutoff Levels:  AMPHETAMINE/METHAMPHETAMINES  1000 ng/mL  BARBITURATES     200 ng/mL  BENZODIAZEPINES     200 ng/mL  COCAINE      300 ng/mL  METHADONE      300 ng/mL  OPIATES      300 ng/mL  PHENCYCLIDINE     25 ng/mL  THC       50 ng/mL    POCT pregnancy, urine [[id number]]  (Normal) Resulted:  12/26/18 1555    Lab Status:  Final result Updated:  12/26/18 1555     EXT PREG TEST UR (Ref: Negative) negative      ED Urine Macroscopic [[id number]]  (Abnormal) Collected:  12/26/18 1548    Lab Status:  Final result Specimen:  Urine Updated:  12/26/18 1549     Color, UA Yellow     Clarity, UA Cloudy     pH, UA 8 5 (H)     Leukocytes, UA Negative     Nitrite, UA Negative     Protein, UA 30 (1+) (A) mg/dl      Glucose, UA Negative mg/dl      Ketones, UA Negative mg/dl      Urobilinogen, UA 0 2 E U /dl      Bilirubin, UA Negative     Blood, UA Moderate (A)     Specific [place], UA 1 020    Narrative:       CLINITEK RESULT    Comprehensive metabolic panel [[id number]]  (Abnormal) Collected:  12/26/18 1426    Lab Status:  Final result Specimen:  Blood from Arm, Right Updated:  12/26/18 1459     Sodium 142 mmol/L      Potassium 3 8 mmol/L      Chloride 105 mmol/L      CO2 24 mmol/L      ANION GAP 13 mmol/L      BUN 6 mg/dL      Creatinine 0 55 (L) mg/dL      Glucose 80 mg/dL      Calcium 8 7 mg/dL      AST 25 U/L      ALT 22 U/L      Alkaline Phosphatase 93 U/L      Total Protein 7 4 g/dL      Albumin 3 8 g/dL      Total Bilirubin 0 30 mg/dL      eGFR 133 ml/min/1 73sq m     Narrative:         National Kidney Disease Education Program recommendations are as follows:  GFR calculation is accurate only with a steady state creatinine  Chronic Kidney disease less than 60 ml/min/1 73 sq  meters  Kidney failure less than 15 ml/min/1 73 sq  meters  Lipase [[id number]]  (Normal) Collected:  12/26/18 1426    Lab Status:  Final result Specimen:  Blood from Arm, Right Updated:  12/26/18 1459     Lipase 102 u/L     CBC and differential [[id number]]  (Abnormal) Collected:  12/26/18 1426    Lab Status:  Final result Specimen:  Blood from Arm, Right Updated:  12/26/18 1437     WBC 16 09 (H) Thousand/uL      RBC 4 31 Million/uL      Hemoglobin 12 8 g/dL      Hematocrit 38 3 %      MCV 89 fL      MCH 29 7 pg      MCHC 33 4 g/dL      RDW 15 1 %      MPV 8 5 (L) fL      Platelets 757 Thousands/uL      nRBC 0 /100 WBCs      Neutrophils Relative 79 (H) %      Immat GRANS % 1 %      Lymphocytes Relative 11 (L) %      Monocytes Relative 8 %      Eosinophils Relative 1 %      Basophils Relative 0 %      Neutrophils Absolute 12 76 (H) Thousands/µL      Immature Grans Absolute 0 09 Thousand/uL      Lymphocytes Absolute 1 82 Thousands/µL      Monocytes Absolute 1 24 (H) Thousand/µL      Eosinophils Absolute 0 11 Thousand/µL      Basophils Absolute 0 07 Thousands/µL     POCT alcohol breath test [[id number]]  (Normal) Resulted:  12/26/18 1425    Lab Status:  Final result Updated:  12/26/18 1425     EXTBreath Alcohol 0 00                 CT abdomen pelvis with contrast   Final Result by Zenobia Boyd MD (12/26 1659)      No acute findings  Workstation performed: [id number]         XR chest 2 views   Final Result by Claribel Wilkins MD (12/26 6208)      No active pulmonary disease              Workstation performed: [id number]                    Procedures  Procedures       Phone Contacts  ED Phone Contact    ED Course                               MDM  Number of Diagnoses or Management Options  Abdominal pain: new and requires workup  Acute alcoholic gastritis with hemorrhage: new and requires workup  Depression: new and requires workup  H/O self-harm: new and requires workup  Melena: new and requires workup  Diagnosis management comments: Afebrile and hemodynamically stable  Dry mucous membranes  Appears dehydrated  Abdomen diffusely tender, worse in the epigastrium, voluntary guarding  Black guaiac-positive stool present in the rectal vault, patient endorses multiple soft black bowel movements today and streaks of blood in her vomitus  Suspect alcoholic gastritis given history  Endorses yellow vaginal discharge and was treated for Chlamydia last year, pelvic exam unremarkable with the exception of menstrual bleeding, no cervical motion tenderness to suggest PID, no adnexal tenderness  CBC with leukocytosis to 16, normal hemoglobin  Normal renal function and transaminases  Lipase within normal limits making pancreatitis unlikely  UDS positive for benzodiazepines and marijuana  UA not consistent with UTI and pregnancy test negative  CT abdomen pelvis performed with no acute findings  Patient started on Protonix drip for concern for hemorrhagic alcoholic gastritis  Case discussed with Dr Erica George with Gastroenterology, agrees with admission, will likely perform endoscopy tomorrow  Patient made NPO, unfortunately ate french fries despite being explicitly asked not to  Understands not to eat or drink anything else  One dose of Ativan given for anxiety and home clonidine given  CIWA protocol ordered given history of alcohol withdrawal     Superficial self-induced lacerations to the right forearm are healing, do not appear infected  Tdap updated  Intermittent momentary sharp chest pain would be atypical for cardiac etiology    I personally interpreted the patient's EKG which reveals normal rate, normal sinus rhythm, normal axis, normal intervals, no ischemic changes  Chest x-ray unremarkable  History and exam not consistent with Boerhaave's  History not consistent with PE or ischemia  Patient endorses depression and self harm, but denies suicidal ideation  Expresses remorse for her heavy drinking  She will likely require psychiatric evaluation following medical clearance, in the absence of active suicidal ideation will not place under suicide precautions  Patient admitted to Medicine for further management  CritCare Time     Afebrile and hemodynamically stable  Dry mucous membranes  Appears dehydrated  Abdomen diffusely tender, worse in the epigastrium, voluntary guarding  Black guaiac-positive stool present in the rectal vault, patient endorses multiple soft black bowel movements today and streaks of blood in her vomitus  Suspect alcoholic gastritis given history  Endorses yellow vaginal discharge and was treated for Chlamydia last year, pelvic exam unremarkable with the exception of menstrual bleeding, no cervical motion tenderness to suggest PID, no adnexal tenderness  CBC with leukocytosis to 16, normal hemoglobin  Normal renal function and transaminases  Lipase within normal limits making pancreatitis unlikely  UDS positive for benzodiazepines and marijuana  UA not consistent with UTI and pregnancy test negative  CT abdomen pelvis performed with no acute findings  Patient started on Protonix drip for concern for hemorrhagic alcoholic gastritis  Case discussed with Dr Xu Manrique with Gastroenterology, agrees with admission, will likely perform endoscopy tomorrow  Patient made NPO, unfortunately ate french fries despite being explicitly asked not to  Understands not to eat or drink anything else  One dose of Ativan given for anxiety and home clonidine given  CIWA protocol ordered given history of alcohol withdrawal     Superficial self-induced lacerations to the right forearm are healing, do not appear infected    Tdap updated  Intermittent momentary sharp chest pain would be atypical for cardiac etiology  I personally interpreted the patient's EKG which reveals normal rate, normal sinus rhythm, normal axis, normal intervals, no ischemic changes  Chest x-ray unremarkable  History and exam not consistent with Boerhaave's  History not consistent with PE or ischemia  Patient admitted to Medicine for further management  Dr Fredick Schwab    Disposition  Final diagnoses:   Acute alcoholic gastritis with hemorrhage   Melena   Abdominal pain   Depression   H/O self-harm     Time reflects when diagnosis was documented in both MDM as applicable and the Disposition within this note     Time User Action Codes Description Comment    12/26/2018  6:34 PM Atha Jest Add [J28 70] Acute alcoholic gastritis with hemorrhage     12/26/2018  6:34 PM Miguelito Hans [K92 1] Melena     12/26/2018  6:35 PM Atha Jest Add [R10 9] Abdominal pain     12/26/2018  6:35 PM Atha Jest Add [F32 9] Depression     12/26/2018  6:35 PM Atha Jest Add [Z91 5] H/O self-harm     12/26/2018  7:52 PM Aleksey Marquez R Add [F31 9] Bipolar disorder [facility])       ED Disposition     ED Disposition Condition Comment    Admit  Case was discussed with [facility] and the patient's admission status was agreed to be Admission Status: inpatient status to the service of Dr Kelley Tovar   Follow-up Information    None         Current Discharge Medication List      CONTINUE these medications which have NOT CHANGED    Details   cloNIDine (CATAPRES) 0 2 mg tablet Take 0 2 mg by mouth 2 (two) times a day      gabapentin (NEURONTIN) 300 mg capsule Take 300 mg by mouth daily      gabapentin (NEURONTIN) 600 MG tablet Take 600 mg by mouth daily at bedtime      hydrOXYzine (VISTARIL) 100 MG capsule Take 100 mg by mouth 4 (four) times a day as needed for itching           No discharge procedures on file      ED Provider  Electronically Signed by Gely Ambriz MD  12/27/18 1012

## 2018-12-26 NOTE — ED NOTES
Pt transported to [facility] via stretcher  Pt appeared to be in no acute distress        Dai Tomas RN  12/26/18 0353

## 2018-12-27 ENCOUNTER — APPOINTMENT (INPATIENT)
Dept: RADIOLOGY | Facility: HOSPITAL | Age: 23
DRG: 370 | End: 2018-12-27
Payer: COMMERCIAL

## 2018-12-27 PROBLEM — D64.9 ANEMIA: Status: ACTIVE | Noted: 2018-12-27

## 2018-12-27 LAB
ANION GAP SERPL CALCULATED.3IONS-SCNC: 9 MMOL/L (ref 4–13)
ATRIAL RATE: 70 BPM
BUN SERPL-MCNC: 7 MG/DL (ref 5–25)
CALCIUM SERPL-MCNC: 8 MG/DL (ref 8.3–10.1)
CHLORIDE SERPL-SCNC: 109 MMOL/L (ref 100–108)
CO2 SERPL-SCNC: 23 MMOL/L (ref 21–32)
CREAT SERPL-MCNC: 0.6 MG/DL (ref 0.6–1.3)
ERYTHROCYTE [DISTWIDTH] IN BLOOD BY AUTOMATED COUNT: 15.4 % (ref 11.6–15.1)
FERRITIN SERPL-MCNC: 33 NG/ML (ref 8–388)
FOLATE SERPL-MCNC: 5.8 NG/ML (ref 3.1–17.5)
GFR SERPL CREATININE-BSD FRML MDRD: 129 ML/MIN/1.73SQ M
GLUCOSE SERPL-MCNC: 92 MG/DL (ref 65–140)
HCT VFR BLD AUTO: 32.3 % (ref 34.8–46.1)
HGB BLD-MCNC: 10.7 G/DL (ref 11.5–15.4)
IRON SERPL-MCNC: 56 UG/DL (ref 50–170)
MAGNESIUM SERPL-MCNC: 1.7 MG/DL (ref 1.6–2.6)
MCH RBC QN AUTO: 29.8 PG (ref 26.8–34.3)
MCHC RBC AUTO-ENTMCNC: 33.1 G/DL (ref 31.4–37.4)
MCV RBC AUTO: 90 FL (ref 82–98)
P AXIS: 54 DEGREES
PLATELET # BLD AUTO: 243 THOUSANDS/UL (ref 149–390)
PMV BLD AUTO: 8.5 FL (ref 8.9–12.7)
POTASSIUM SERPL-SCNC: 3.6 MMOL/L (ref 3.5–5.3)
PR INTERVAL: 140 MS
PROCALCITONIN SERPL-MCNC: <0.05 NG/ML
QRS AXIS: 78 DEGREES
QRSD INTERVAL: 72 MS
QT INTERVAL: 424 MS
QTC INTERVAL: 457 MS
RBC # BLD AUTO: 3.59 MILLION/UL (ref 3.81–5.12)
SODIUM SERPL-SCNC: 141 MMOL/L (ref 136–145)
T WAVE AXIS: 63 DEGREES
TIBC SERPL-MCNC: 258 UG/DL (ref 250–450)
VENTRICULAR RATE: 70 BPM
VIT B12 SERPL-MCNC: 344 PG/ML (ref 100–900)
WBC # BLD AUTO: 9.92 THOUSAND/UL (ref 4.31–10.16)

## 2018-12-27 PROCEDURE — 93010 ELECTROCARDIOGRAM REPORT: CPT | Performed by: INTERNAL MEDICINE

## 2018-12-27 PROCEDURE — 94762 N-INVAS EAR/PLS OXIMTRY CONT: CPT

## 2018-12-27 PROCEDURE — 94760 N-INVAS EAR/PLS OXIMETRY 1: CPT

## 2018-12-27 PROCEDURE — C9113 INJ PANTOPRAZOLE SODIUM, VIA: HCPCS | Performed by: INTERNAL MEDICINE

## 2018-12-27 PROCEDURE — 71046 X-RAY EXAM CHEST 2 VIEWS: CPT

## 2018-12-27 PROCEDURE — 82746 ASSAY OF FOLIC ACID SERUM: CPT | Performed by: PHYSICIAN ASSISTANT

## 2018-12-27 PROCEDURE — 82607 VITAMIN B-12: CPT | Performed by: PHYSICIAN ASSISTANT

## 2018-12-27 PROCEDURE — 85027 COMPLETE CBC AUTOMATED: CPT | Performed by: INTERNAL MEDICINE

## 2018-12-27 PROCEDURE — 99254 IP/OBS CNSLTJ NEW/EST MOD 60: CPT | Performed by: INTERNAL MEDICINE

## 2018-12-27 PROCEDURE — 99232 SBSQ HOSP IP/OBS MODERATE 35: CPT | Performed by: PHYSICIAN ASSISTANT

## 2018-12-27 PROCEDURE — 83550 IRON BINDING TEST: CPT | Performed by: PHYSICIAN ASSISTANT

## 2018-12-27 PROCEDURE — 83735 ASSAY OF MAGNESIUM: CPT | Performed by: INTERNAL MEDICINE

## 2018-12-27 PROCEDURE — 84145 PROCALCITONIN (PCT): CPT | Performed by: INTERNAL MEDICINE

## 2018-12-27 PROCEDURE — 80048 BASIC METABOLIC PNL TOTAL CA: CPT | Performed by: INTERNAL MEDICINE

## 2018-12-27 PROCEDURE — 83540 ASSAY OF IRON: CPT | Performed by: PHYSICIAN ASSISTANT

## 2018-12-27 PROCEDURE — 94664 DEMO&/EVAL PT USE INHALER: CPT

## 2018-12-27 PROCEDURE — 82728 ASSAY OF FERRITIN: CPT | Performed by: PHYSICIAN ASSISTANT

## 2018-12-27 RX ORDER — LORAZEPAM 2 MG/ML
0.5 INJECTION INTRAMUSCULAR EVERY 4 HOURS PRN
Status: DISCONTINUED | OUTPATIENT
Start: 2018-12-27 | End: 2018-12-28 | Stop reason: HOSPADM

## 2018-12-27 RX ORDER — LORAZEPAM 1 MG/1
2 TABLET ORAL ONCE
Status: COMPLETED | OUTPATIENT
Start: 2018-12-27 | End: 2018-12-28

## 2018-12-27 RX ORDER — PHENAZOPYRIDINE HYDROCHLORIDE 100 MG/1
100 TABLET, FILM COATED ORAL 3 TIMES DAILY PRN
Status: DISCONTINUED | OUTPATIENT
Start: 2018-12-27 | End: 2018-12-28 | Stop reason: HOSPADM

## 2018-12-27 RX ORDER — ALBUTEROL SULFATE 90 UG/1
2 AEROSOL, METERED RESPIRATORY (INHALATION) EVERY 4 HOURS PRN
Status: DISCONTINUED | OUTPATIENT
Start: 2018-12-27 | End: 2018-12-28 | Stop reason: HOSPADM

## 2018-12-27 RX ORDER — GUAIFENESIN 600 MG
600 TABLET, EXTENDED RELEASE 12 HR ORAL EVERY 12 HOURS SCHEDULED
Status: DISCONTINUED | OUTPATIENT
Start: 2018-12-27 | End: 2018-12-28 | Stop reason: HOSPADM

## 2018-12-27 RX ADMIN — GUAIFENESIN 600 MG: 600 TABLET, EXTENDED RELEASE ORAL at 10:01

## 2018-12-27 RX ADMIN — GUAIFENESIN 600 MG: 600 TABLET, EXTENDED RELEASE ORAL at 22:26

## 2018-12-27 RX ADMIN — LORAZEPAM 0.5 MG: 2 INJECTION INTRAMUSCULAR; INTRAVENOUS at 20:13

## 2018-12-27 RX ADMIN — PHENAZOPYRIDINE HYDROCHLORIDE 100 MG: 100 TABLET ORAL at 12:21

## 2018-12-27 RX ADMIN — CLONIDINE HYDROCHLORIDE 0.2 MG: 0.1 TABLET ORAL at 08:17

## 2018-12-27 RX ADMIN — DICYCLOMINE HYDROCHLORIDE 10 MG: 10 CAPSULE ORAL at 22:26

## 2018-12-27 RX ADMIN — ACETAMINOPHEN 650 MG: 325 TABLET, FILM COATED ORAL at 18:20

## 2018-12-27 RX ADMIN — DICYCLOMINE HYDROCHLORIDE 10 MG: 10 CAPSULE ORAL at 17:13

## 2018-12-27 RX ADMIN — NICOTINE 1 PATCH: 7 PATCH TRANSDERMAL at 08:18

## 2018-12-27 RX ADMIN — PANTOPRAZOLE SODIUM 40 MG: 40 INJECTION, POWDER, FOR SOLUTION INTRAVENOUS at 22:27

## 2018-12-27 RX ADMIN — CLONIDINE HYDROCHLORIDE 0.2 MG: 0.1 TABLET ORAL at 18:20

## 2018-12-27 RX ADMIN — PANTOPRAZOLE SODIUM 40 MG: 40 INJECTION, POWDER, FOR SOLUTION INTRAVENOUS at 08:18

## 2018-12-27 RX ADMIN — DEXTROSE AND SODIUM CHLORIDE 100 ML/HR: 5; .9 INJECTION, SOLUTION INTRAVENOUS at 06:10

## 2018-12-27 RX ADMIN — GABAPENTIN 600 MG: 300 CAPSULE ORAL at 22:26

## 2018-12-27 RX ADMIN — DICYCLOMINE HYDROCHLORIDE 10 MG: 10 CAPSULE ORAL at 06:05

## 2018-12-27 RX ADMIN — LORAZEPAM 0.5 MG: 2 INJECTION INTRAMUSCULAR; INTRAVENOUS at 14:35

## 2018-12-27 RX ADMIN — MORPHINE SULFATE 2 MG: 2 INJECTION, SOLUTION INTRAMUSCULAR; INTRAVENOUS at 21:49

## 2018-12-27 RX ADMIN — MORPHINE SULFATE 2 MG: 2 INJECTION, SOLUTION INTRAMUSCULAR; INTRAVENOUS at 17:14

## 2018-12-27 RX ADMIN — DEXTROSE AND SODIUM CHLORIDE 75 ML/HR: 5; .9 INJECTION, SOLUTION INTRAVENOUS at 17:17

## 2018-12-27 RX ADMIN — MORPHINE SULFATE 2 MG: 2 INJECTION, SOLUTION INTRAMUSCULAR; INTRAVENOUS at 05:34

## 2018-12-27 RX ADMIN — ONDANSETRON 4 MG: 2 INJECTION INTRAMUSCULAR; INTRAVENOUS at 14:35

## 2018-12-27 RX ADMIN — GABAPENTIN 300 MG: 300 CAPSULE ORAL at 08:17

## 2018-12-27 RX ADMIN — ONDANSETRON 4 MG: 2 INJECTION INTRAMUSCULAR; INTRAVENOUS at 21:49

## 2018-12-27 NOTE — ASSESSMENT & PLAN NOTE
Patient reports she cut with class on her right wrist due to anxiety and pain  Denies any suicidal ideation

## 2018-12-27 NOTE — ASSESSMENT & PLAN NOTE
Possible alcoholic gastritis  Also menstrual cramps(as she started having periods since this morning)  Also history of PID    Continue IV PPI  Keep NPO  GI evaluation-ED spoke to GI, plan is to have endoscopy a m    IV fluids  Follow GC and chlamydia sent in ED

## 2018-12-27 NOTE — UTILIZATION REVIEW
Initial Clinical Review    Admission: Date/Time/Statement: 12/26/18 @ 1836    Orders Placed This Encounter   Procedures    Inpatient Admission (expected length of stay for this patient is greater than two midnights)     Standing Status:   Standing     Number of Occurrences:   1     Order Specific Question:   Admitting Physician     Answer:   Frankey Eon     Order Specific Question:   Level of Care     Answer:   Med Surg [16]     Order Specific Question:   Estimated length of stay     Answer:   More than 2 Midnights     Order Specific Question:   Certification     Answer:   I certify that inpatient services are medically necessary for this patient for a duration of greater than two midnights  See H&P and MD Progress Notes for additional information about the patient's course of treatment  ED: Date/Time/Mode of Arrival:   ED Arrival Information     Expected Arrival Acuity Means of Arrival Escorted By Service Admission Type    - 12/26/2018 12:50 Emergent Wheelchair Family Member General Medicine Emergency    Arrival Complaint    psych eval          Chief Complaint:   Chief Complaint   Patient presents with    Abdominal Pain     pt here with c/o severe abd pain  states she may have had alcohol poisoning  onset yesterday  also reports black, tarry stool   Psychiatric Evaluation     pt ran out of her [employer] meds for a few days now  denies any thoughts of SI or HI  History of Illness: 12-year-old female presents for evaluation of abdominal pain and vomiting  Patient states that she went on a drinking binge yesterday, drinking 24 shots of liquor and taking 1 mg of Xanax that is not prescribed to her      ED Vital Signs:   ED Triage Vitals [12/26/18 1255]   Temperature Pulse Respirations Blood Pressure SpO2   98 2 °F (36 8 °C) 91 18 149/72 100 %      Temp Source Heart Rate Source Patient Position - Orthostatic VS BP Location FiO2 (%)   Oral Monitor Sitting Left arm --      Pain Score       Worst Possible Pain        Wt Readings from Last 1 Encounters:   12/26/18 52 1 kg (114 lb 13 8 oz)       Vital Signs (abnormal): WNL    Abnormal Labs/Diagnostic Test Results:   CT Abd/ Pelvis - No acute findings  12/26/18 1426     WBC 4 31 - 10 16 Thousand/uL 16 09     RBC 3 81 - 5 12 Million/uL 4 31    Hemoglobin 11 5 - 15 4 g/dL 12 8    Hematocrit 34 8 - 46 1 % 38 3       12/27/18 0446    RBC 3 81 - 5 12 Million/uL 3 59     Hemoglobin 11 5 - 15 4 g/dL 10 7     Hematocrit 34 8 - 46 1 % 32 3       Benzodiazepine Urine Positive     THC Urine Positive         ED Treatment:   Medication Administration from 12/26/2018 1250 to 12/26/2018 1925       Date/Time Order Dose Route Action     12/26/2018 1447 cloNIDine (CATAPRES) tablet 0 2 mg 0 2 mg Oral Given     12/26/2018 1431 sodium chloride 0 9 % bolus 1,000 mL 1,000 mL Intravenous New Bag     12/26/2018 1447 ondansetron (ZOFRAN-ODT) dispersible tablet 4 mg 4 mg Oral Given     12/26/2018 1534 tetanus-diphtheria-acellular pertussis (BOOSTRIX) IM injection 0 5 mL 0 5 mL Intramuscular Given     12/26/2018 1622 pantoprazole (PROTONIX) injection 40 mg 40 mg Intravenous Given     12/26/2018 1626 pantoprazole (PROTONIX) 80 mg in sodium chloride 0 9 % 100 mL infusion 8 mg/hr Intravenous New Bag     12/26/2018 1615 iohexol (OMNIPAQUE) 350 MG/ML injection (MULTI-DOSE) 100 mL 100 mL Intravenous Given     12/26/2018 1718 LORazepam (ATIVAN) tablet 1 mg 1 mg Oral Given          Past Medical/Surgical History:    Active Ambulatory Problems     Diagnosis Date Noted    Encounter for psychological evaluation 12/26/2018     Resolved Ambulatory Problems     Diagnosis Date Noted    No Resolved Ambulatory Problems     Past Medical History:   Diagnosis Date    Anemia     Anxiety     Asthma     Bipolar 1 disorder ([facility] [address] )     Depression     IC (interstitial cystitis)        Admitting Diagnosis: Melena [K92 1]  Depression [F32 9]  Abdominal pain [R10 9]  H/O self-harm [Z91 5]  Encounter for psychological evaluation [U76 9]  Acute alcoholic gastritis with hemorrhage [K29 21]    Age/Sex: 21 y o  female    Assessment/Plan:   23y Female to ED presents with abdominal pain and black tarry stools  Patient was binge drinking alcohol yesterday, and reports that she approximately had 24 shots of liquor  Then took 1 mg Xanax that was left behind from last prescription  Today developed severe abdominal pain with nausea and vomiting  Reports that she had several episodes of vomiting  Abdominal pain mostly in the lower abdomen, and sometimes in the epigastric region  Pt did have bowel movement today which was black and tolerating, also saw some blood  History of psychiatric illness, and inpatient psychiatric admission approximately 1 year ago  Pt is being admitted with Abdominal Pain/ Melena/ H/O self-harm  Possible alcoholic gastritis  History of PID   NPO  Gastroenterology consult  IVF   F/u GC and chlamydia sent in ED  Plan for Endoscopy in AM  Monitor for bleeding      Admission Orders:  NPO; Sips with meds  12/17 GI Lab - EGD  [facility]  Psychiatry cons  Gastroenterology cons    Scheduled Meds:   Current Facility-Administered Medications:  cloNIDine 0 2 mg Oral BID   dicyclomine 10 mg Oral 4x Daily (AC & HS)   gabapentin 300 mg Oral Daily   gabapentin 600 mg Oral HS   guaiFENesin 600 mg Oral Q12H [address]   nicotine 1 patch Transdermal Daily   pantoprazole 40 mg Intravenous Q12H [address]     Continuous Infusions:   dextrose 5 % and sodium chloride 0 9 % 100 mL/hr Last Rate: 100 mL/hr (12/27/18 0610)     PRN Meds:   acetaminophen    albuterol    calcium carbonate    docusate sodium    hydrOXYzine HCL    LORazepam    morphine injection    ondansetron

## 2018-12-27 NOTE — ASSESSMENT & PLAN NOTE
· POA abdominal pain with ?melena--Possible alcoholic gastritis given binge drinking of 24 shots  · Also menstrual cramps(as she started her period)--add aqua K pad; avoid NSAIDS  · Also history of PID  · Minimize use of narcotics  · Continue IV PPI  · Keep NPO  · GI evaluation appreciated, await EGD  · IV fluids

## 2018-12-27 NOTE — CONSULTS
Consultation - [facility]  Estephania Parker 21 y o  female MRN: [id number]  Unit/Bed#: -01 Encounter: [id number]        Consults    Reason for Consult / Principal Problem:     N/v/ epigastric pain melena      ASSESSMENT AND PLAN:      Melena  Anemia   -One episode of melena following episode of binge drinking tequila  -Hemoglobin is 10 7 from 12 8 after receiving fluids, she reports chronic JAYY secondary to her menstrual period (which she has currently)  -check iron panel  -continue to monitor H/H  -PPI BID  -Plan for EGD, differential includes alcoholic gastritis, PUD, anyi vásquez tear     N/V   Epigastric pain   -secondary to consuming 24 shots of tequila, she had been abstinent for several months prior to this  -Her menstrual cramps are likely contributing to abdominal pain as well   -NPO for EGD today       ______________________________________________________________________    HPI:  Estephania Parker is a 20 yo female with a history of bipolar disorder, depression, JAYY who presents with N/V/abdominal pain and episode of melena  She states last week she ran out of her home meds and was feeling significant depression regarding the holidays so she took 24 shots of tequila along with naproxen and xanas  Prior to this she has mild lower abdominal cramping which she attributed to her menstrual period which started yesterday  She developed nausae, vomiting, epigastruc abdominal pain  When she woke up yesterday she has a large black bm and vomited with red streaks with severe epigastric pain which persists  She reports drinking intermittently when she is depressed  She denies chronic NSAID use  She reports a history of JAYY, she had taken iron in the past, it was attributed to her heavy menstrual periods, she had a negative egd and colonoscopy and celiac serologies were negative  Her hemoglobin is 10 7 from 12 8 here         REVIEW OF SYSTEMS:    CONSTITUTIONAL: Denies any fever, chills  HEENT: Denies hearing loss or visual disturbances  CARDIOVASCULAR: No chest pain or palpitations  RESPIRATORY: Denies any cough, hemoptysis, shortness of breath or dyspnea on exertion  GASTROINTESTINAL: As noted in the History of Present Illness  GENITOURINARY: No problems with urination  Denies any hematuria or dysuria  NEUROLOGIC: No dizziness or vertigo, denies headaches  MUSCULOSKELETAL: Denies any muscle or joint pain  SKIN: Denies skin rashes or itching  Gyn: active menstrual period with cramping  PSYCHOSOCIAL: + depression /anxiety  Historical Information   Past Medical History:   Diagnosis Date    Anemia     Anxiety     Asthma     Bipolar 1 disorder ([facility] [address] )     Depression     IC (interstitial cystitis)      History reviewed  No pertinent surgical history  Social History   History   Alcohol Use    Yes     History   Drug Use No     History   Smoking Status    Current Every Day Smoker   Smokeless Tobacco    Never Used     History reviewed  No pertinent family history      Meds/Allergies     Prescriptions Prior to Admission   Medication    cloNIDine (CATAPRES) 0 2 mg tablet    gabapentin (NEURONTIN) 300 mg capsule    gabapentin (NEURONTIN) 600 MG tablet    hydrOXYzine (VISTARIL) 100 MG capsule     Current Facility-Administered Medications   Medication Dose Route Frequency    acetaminophen (TYLENOL) tablet 650 mg  650 mg Oral Q6H PRN    albuterol (PROVENTIL HFA,VENTOLIN HFA) inhaler 2 puff  2 puff Inhalation Q4H PRN    calcium carbonate (TUMS) chewable tablet 1,000 mg  1,000 mg Oral Daily PRN    cloNIDine (CATAPRES) tablet 0 2 mg  0 2 mg Oral BID    dextrose 5 % and sodium chloride 0 9 % infusion  100 mL/hr Intravenous Continuous    dicyclomine (BENTYL) capsule 10 mg  10 mg Oral 4x Daily (AC & HS)    docusate sodium (COLACE) capsule 100 mg  100 mg Oral BID PRN    gabapentin (NEURONTIN) capsule 300 mg  300 mg Oral Daily    gabapentin (NEURONTIN) capsule 600 mg  600 mg Oral HS    guaiFENesin (MUCINEX) 12 hr tablet 600 mg  600 mg Oral Q12H [facility]    hydrOXYzine HCL (ATARAX) tablet 100 mg  100 mg Oral Q6H PRN    LORazepam (ATIVAN) 2 mg/mL injection 0 5 mg  0 5 mg Intravenous Q4H PRN    morphine injection 2 mg  2 mg Intravenous Q4H PRN    nicotine (NICODERM CQ) 7 mg/24hr TD 24 hr patch 1 patch  1 patch Transdermal Daily    ondansetron (ZOFRAN) injection 4 mg  4 mg Intravenous Q6H PRN    pantoprazole (PROTONIX) injection 40 mg  40 mg Intravenous Q12H HAKAN    phenazopyridine (PYRIDIUM) tablet 100 mg  100 mg Oral TID PRN       Allergies   Allergen Reactions    Penicillins            Objective     Blood pressure 98/53, pulse 81, temperature 98 4 °F (36 9 °C), temperature source Oral, resp  rate 16, height 5' 3" (1 6 m), weight 52 1 kg (114 lb 13 8 oz), last menstrual period 12/26/2018, SpO2 99 %  Body mass index is 20 35 kg/m²        Intake/Output Summary (Last 24 hours) at 12/27/18 1209  Last data filed at 12/26/18 1631   Gross per 24 hour   Intake             1000 ml   Output                0 ml   Net             1000 ml         PHYSICAL EXAM:      General Appearance:   Alert, cooperative, no distress   HEENT:   Normocephalic, atraumatic, anicteric      Neck:  Supple, symmetrical, trachea midline   Lungs:   Clear to auscultation bilaterally; no rales, rhonchi or wheezing; respirations unlabored    Heart[de-identified]   Regular rate and rhythm   Abdomen:   Soft, non-tender, non-distended; normal bowel sounds; no masses, no organomegaly    Genitalia:   Deferred    Rectal:   Deferred    Extremities:  Superficial cuts on the right wrist   Pulses:  2+ and symmetric all extremities    Skin:  No jaundice, rashes, or lesions    Lymph nodes:  No palpable cervical lymphadenopathy        Lab Results:   Admission on 12/26/2018   Component Date Value    WBC 12/26/2018 16 09*    RBC 12/26/2018 4 31     Hemoglobin 12/26/2018 12 8     Hematocrit 12/26/2018 38 3     MCV 12/26/2018 89     MCH 12/26/2018 29 7     MCHC 12/26/2018 33 4     RDW 12/26/2018 15 1     MPV 12/26/2018 8 5*    Platelets 76/80/2709 303     nRBC 12/26/2018 0     Neutrophils Relative 12/26/2018 79*    Immat GRANS % 12/26/2018 1     Lymphocytes Relative 12/26/2018 11*    Monocytes Relative 12/26/2018 8     Eosinophils Relative 12/26/2018 1     Basophils Relative 12/26/2018 0     Neutrophils Absolute 12/26/2018 12 76*    Immature Grans Absolute 12/26/2018 0 09     Lymphocytes Absolute 12/26/2018 1 82     Monocytes Absolute 12/26/2018 1 24*    Eosinophils Absolute 12/26/2018 0 11     Basophils Absolute 12/26/2018 0 07     Sodium 12/26/2018 142     Potassium 12/26/2018 3 8     Chloride 12/26/2018 105     CO2 12/26/2018 24     ANION GAP 12/26/2018 13     BUN 12/26/2018 6     Creatinine 12/26/2018 0 55*    Glucose 12/26/2018 80     Calcium 12/26/2018 8 7     AST 12/26/2018 25     ALT 12/26/2018 22     Alkaline Phosphatase 12/26/2018 93     Total Protein 12/26/2018 7 4     Albumin 12/26/2018 3 8     Total Bilirubin 12/26/2018 0 30     eGFR 12/26/2018 133     EXTBreath Alcohol 12/26/2018 0 00     Amph/Meth UR 12/26/2018 Negative     Barbiturate Ur 12/26/2018 Negative     Benzodiazepine Urine 12/26/2018 Positive*    Cocaine Urine 12/26/2018 Negative     Methadone Urine 12/26/2018 Negative     Opiate Urine 12/26/2018 Negative     PCP Ur 12/26/2018 Negative     THC Urine 12/26/2018 Positive*    Lipase 12/26/2018 102     EXT PREG TEST UR (Ref: N* 12/26/2018 negative       Color, UA 12/26/2018 Yellow     Clarity, UA 12/26/2018 Cloudy     pH, UA 12/26/2018 8 5*    Leukocytes, UA 12/26/2018 Negative     Nitrite, UA 12/26/2018 Negative     Protein, UA 12/26/2018 30 (1+)*    Glucose, UA 12/26/2018 Negative     Ketones, UA 12/26/2018 Negative     Urobilinogen, UA 12/26/2018 0 2     Bilirubin, UA 12/26/2018 Negative     Blood, UA 12/26/2018 Moderate*    Specific Gravity, UA 12/26/2018 1 020     RBC, UA 12/26/2018 4-10*  WBC, UA 12/26/2018 0-1*    Epithelial Cells 12/26/2018 Occasional     Bacteria, UA 12/26/2018 Innumerable*    AMORPH PHOSPATES 12/26/2018 Moderate     Sodium 12/27/2018 141     Potassium 12/27/2018 3 6     Chloride 12/27/2018 109*    CO2 12/27/2018 23     ANION GAP 12/27/2018 9     BUN 12/27/2018 7     Creatinine 12/27/2018 0 60     Glucose 12/27/2018 92     Calcium 12/27/2018 8 0*    eGFR 12/27/2018 129     Magnesium 12/27/2018 1 7     WBC 12/27/2018 9 92     RBC 12/27/2018 3 59*    Hemoglobin 12/27/2018 10 7*    Hematocrit 12/27/2018 32 3*    MCV 12/27/2018 90     MCH 12/27/2018 29 8     MCHC 12/27/2018 33 1     RDW 12/27/2018 15 4*    Platelets 79/55/0802 243     MPV 12/27/2018 8 5*    Ventricular Rate 12/26/2018 70     Atrial Rate 12/26/2018 70     TN Interval 12/26/2018 140     QRSD Interval 12/26/2018 72     QT Interval 12/26/2018 424     QTC Interval 12/26/2018 457     P Axis 12/26/2018 54     QRS Axis 12/26/2018 78     T Wave Axis 12/26/2018 63        Imaging Studies: I have personally reviewed pertinent imaging studies

## 2018-12-27 NOTE — PROGRESS NOTES
Apparently patient has been complaining of cough with greenish sputum, and has history of asthma    Anesthesiologist was concerned about giving her anesthesia    Will cancel EGD for today  Check chest x-ray PA lateral  Check procalcitonin  Obtain sputum for culture and sensitivity  Respiratory protocol  Drop in hemoglobin likely due to hydration,  If hemoglobin remained stable, will just treat with PPI follow outpatient with GI for EGD if required

## 2018-12-27 NOTE — PROGRESS NOTES
Consultation - [address] [facility]  Tori Camacho 21 y o  female MRN: [id number]  Unit/Bed#: -01 Encounter: [id number]    Consults    Reason for Consult / Principal Problem: N/V epigastric pain melena    ASSESSMENT AND PLAN:      Melena  Anemia   -One episode of melena following episode of binge drinking tequila  -Hemoglobin is 10 7 from 12 8 after receiving fluids, she reports chronic JAYY secondary to her menstrual period (which she has currently)  -check iron panel  -continue to monitor H/H  -PPI BID  -Plan for EGD, differential includes alcoholic gastritis, PUD, anyi vásquez tear    N/V   Epigastric pain   -secondary to consuming 24 shots of tequila, she had been abstinent for several months prior to this  -Her menstrual cramps are likely contributing to abdominal pain as well   -NPO for EGD today    ______________________________________________________________________    HPI:  Tori Camacho is a 21 y o female with a history of bipolar disorder, depression, iron deficiency anemia presents with N/V/abdominal pain and an episode of melena  She states she last week ran out of her home meds was feeling significant depression regarding the holidays so she took 24 shots of tequila along with naproxen and xanax  Prior to this she had some lower abdominal cramping which she attributed to her menstrual period which started yesterday  She developed nausea, vomiting, epigastric abdominal pain  When she woke up in the morning she had a bowel movement that was looser and black and vomited again bringing up red streaks which severe epigastric pain which prompted her to come to the ED  She reports drinking intermittently when she is depressed  Denies chronic NSAID use  She reports a history of iron deficiency anemia, she had taken iron in the past-it was attributed to her heavy menstrual periods, she had a negative egd and colonoscopy and celiac serologies when first diagnosed   Her hemoglobin is 10 7 from 12 8 here    REVIEW OF SYSTEMS:  CONSTITUTIONAL: Denies any fever, chills  HEENT: Denies hearing loss or visual disturbances  CARDIOVASCULAR: No chest pain or palpitations  RESPIRATORY: Denies any cough, hemoptysis, shortness of breath   GASTROINTESTINAL: As noted in the History of Present Illness  GENITOURINARY: No problems with urination  NEUROLOGIC: No headaches  MUSCULOSKELETAL: Denies any muscle or joint pain  SKIN: Denies skin rashes or itching  ENDOCRINE: Denies excessive thirst    PSYCHOSOCIAL: +depression / anxiety  Historical Information   Past Medical History:   Diagnosis Date    Anemia     Anxiety     Asthma     Bipolar 1 disorder ([facility] [address] )     Depression     IC (interstitial cystitis)      History reviewed  No pertinent surgical history  Social History   History   Alcohol Use    Yes     History   Drug Use No     History   Smoking Status    Current Every Day Smoker   Smokeless Tobacco    Never Used     History reviewed  No pertinent family history      Meds/Allergies     Prescriptions Prior to Admission   Medication    cloNIDine (CATAPRES) 0 2 mg tablet    gabapentin (NEURONTIN) 300 mg capsule    gabapentin (NEURONTIN) 600 MG tablet    hydrOXYzine (VISTARIL) 100 MG capsule     Current Facility-Administered Medications   Medication Dose Route Frequency    acetaminophen (TYLENOL) tablet 650 mg  650 mg Oral Q6H PRN    albuterol (PROVENTIL HFA,VENTOLIN HFA) inhaler 2 puff  2 puff Inhalation Q4H PRN    calcium carbonate (TUMS) chewable tablet 1,000 mg  1,000 mg Oral Daily PRN    cloNIDine (CATAPRES) tablet 0 2 mg  0 2 mg Oral BID    dextrose 5 % and sodium chloride 0 9 % infusion  100 mL/hr Intravenous Continuous    dicyclomine (BENTYL) capsule 10 mg  10 mg Oral 4x Daily (AC & HS)    docusate sodium (COLACE) capsule 100 mg  100 mg Oral BID PRN    gabapentin (NEURONTIN) capsule 300 mg  300 mg Oral Daily    gabapentin (NEURONTIN) capsule 600 mg  600 mg Oral HS    guaiFENesin (MUCINEX) 12 hr tablet 600 mg  600 mg Oral Q12H [address]    hydrOXYzine HCL (ATARAX) tablet 100 mg  100 mg Oral Q6H PRN    LORazepam (ATIVAN) 2 mg/mL injection 0 5 mg  0 5 mg Intravenous Q4H PRN    morphine injection 2 mg  2 mg Intravenous Q4H PRN    nicotine (NICODERM CQ) 7 mg/24hr TD 24 hr patch 1 patch  1 patch Transdermal Daily    ondansetron (ZOFRAN) injection 4 mg  4 mg Intravenous Q6H PRN    pantoprazole (PROTONIX) injection 40 mg  40 mg Intravenous Q12H [address]       Allergies   Allergen Reactions    Penicillins            Objective     Blood pressure 123/68, pulse 78, temperature 98 1 °F (36 7 °C), temperature source Oral, resp  rate 18, height 5' 3" (1 6 m), weight 52 1 kg (114 lb 13 8 oz), last menstrual period 12/26/2018, SpO2 98 %  Body mass index is 20 35 kg/m²        Intake/Output Summary (Last 24 hours) at 12/27/18 1041  Last data filed at 12/26/18 1631   Gross per 24 hour   Intake             1000 ml   Output                0 ml   Net             1000 ml     PHYSICAL EXAM:  The  General Appearance:   Alert, cooperative, no distress   HEENT:   Normocephalic, atraumatic, anicteric      Neck:  Supple, symmetrical, trachea midline   Lungs:   Clear to auscultation bilaterally   Heart[de-identified]   Regular rate and rhythm   Abdomen:   Soft, epigastric tenderness, non-distended; normal bowel sounds   Genitalia:   Deferred    Rectal:   Deferred    Extremities:  Self inflicted superficial cuts on right wrist the   Pulses:  2+ and symmetric all extremities    Skin:  No jaundice, rashes, or lesions    Lymph nodes:  No palpable cervical lymphadenopathy        Lab Results:   Admission on 12/26/2018   Component Date Value    WBC 12/26/2018 16 09*    RBC 12/26/2018 4 31     Hemoglobin 12/26/2018 12 8     Hematocrit 12/26/2018 38 3     MCV 12/26/2018 89     MCH 12/26/2018 29 7     MCHC 12/26/2018 33 4     RDW 12/26/2018 15 1     MPV 12/26/2018 8 5*    Platelets 99/28/3251 303     nRBC 12/26/2018 0     Neutrophils Relative 12/26/2018 79*    Immat GRANS % 12/26/2018 1     Lymphocytes Relative 12/26/2018 11*    Monocytes Relative 12/26/2018 8     Eosinophils Relative 12/26/2018 1     Basophils Relative 12/26/2018 0     Neutrophils Absolute 12/26/2018 12 76*    Immature Grans Absolute 12/26/2018 0 09     Lymphocytes Absolute 12/26/2018 1 82     Monocytes Absolute 12/26/2018 1 24*    Eosinophils Absolute 12/26/2018 0 11     Basophils Absolute 12/26/2018 0 07     Sodium 12/26/2018 142     Potassium 12/26/2018 3 8     Chloride 12/26/2018 105     CO2 12/26/2018 24     ANION GAP 12/26/2018 13     BUN 12/26/2018 6     Creatinine 12/26/2018 0 55*    Glucose 12/26/2018 80     Calcium 12/26/2018 8 7     AST 12/26/2018 25     ALT 12/26/2018 22     Alkaline Phosphatase 12/26/2018 93     Total Protein 12/26/2018 7 4     Albumin 12/26/2018 3 8     Total Bilirubin 12/26/2018 0 30     eGFR 12/26/2018 133     EXTBreath Alcohol 12/26/2018 0 00     Amph/Meth UR 12/26/2018 Negative     Barbiturate Ur 12/26/2018 Negative     Benzodiazepine Urine 12/26/2018 Positive*    Cocaine Urine 12/26/2018 Negative     Methadone Urine 12/26/2018 Negative     Opiate Urine 12/26/2018 Negative     PCP Ur 12/26/2018 Negative     THC Urine 12/26/2018 Positive*    Lipase 12/26/2018 102     EXT PREG TEST UR (Ref: N* 12/26/2018 negative       Color, UA 12/26/2018 Yellow     Clarity, UA 12/26/2018 Cloudy     pH, UA 12/26/2018 8 5*    Leukocytes, UA 12/26/2018 Negative     Nitrite, UA 12/26/2018 Negative     Protein, UA 12/26/2018 30 (1+)*    Glucose, UA 12/26/2018 Negative     Ketones, UA 12/26/2018 Negative     Urobilinogen, UA 12/26/2018 0 2     Bilirubin, UA 12/26/2018 Negative     Blood, UA 12/26/2018 Moderate*    Specific Gravity, UA 12/26/2018 1 020     RBC, UA 12/26/2018 4-10*    WBC, UA 12/26/2018 0-1*    Epithelial Cells 12/26/2018 Occasional     Bacteria, UA 12/26/2018 Innumerable*    AMORPH PHOSPATES 12/26/2018 Moderate     Sodium 12/27/2018 141     Potassium 12/27/2018 3 6     Chloride 12/27/2018 109*    CO2 12/27/2018 23     ANION GAP 12/27/2018 9     BUN 12/27/2018 7     Creatinine 12/27/2018 0 60     Glucose 12/27/2018 92     Calcium 12/27/2018 8 0*    eGFR 12/27/2018 129     Magnesium 12/27/2018 1 7     WBC 12/27/2018 9 92     RBC 12/27/2018 3 59*    Hemoglobin 12/27/2018 10 7*    Hematocrit 12/27/2018 32 3*    MCV 12/27/2018 90     MCH 12/27/2018 29 8     MCHC 12/27/2018 33 1     RDW 12/27/2018 15 4*    Platelets 60/59/7686 243     MPV 12/27/2018 8 5*    Ventricular Rate 12/26/2018 70     Atrial Rate 12/26/2018 70     OR Interval 12/26/2018 140     QRSD Interval 12/26/2018 72     QT Interval 12/26/2018 424     QTC Interval 12/26/2018 457     P Axis 12/26/2018 54     QRS Axis 12/26/2018 78     T Wave Axis 12/26/2018 63      Imaging Studies: I have personally reviewed pertinent imaging studies

## 2018-12-27 NOTE — PROGRESS NOTES
Progress Note - Estephania Parker 1995, 21 y o  female MRN: [id number]    Unit/Bed#: -01 Encounter: [id number]    Primary Care Provider: No primary care provider on file  Date and time admitted to hospital: 12/26/2018  1:47 PM  * Abdominal pain   Assessment & Plan    · POA abdominal pain with ?melena--Possible alcoholic gastritis given binge drinking of 24 shots  · Also menstrual cramps(as she started her period)--add aqua K pad; avoid NSAIDS  · Also history of PID  · Minimize use of narcotics  · Continue IV PPI  · Keep NPO  · GI evaluation appreciated, await EGD  · IV fluids       Bipolar disorder ([facility] [address] )   Assessment & Plan    · Psychiatric evaluation; hold parameters on clonidine to prevent hypotension     Anemia   Assessment & Plan    · Chronic anemia--check iron panel     H/O self-harm   Assessment & Plan    · Patient reports she cut with glass on her right wrist due to anxiety and pain  Denies any suicidal ideation-psych eval       VTE Pharmacologic Prophylaxis:   Pharmacologic: Pharmacologic VTE Prophylaxis contraindicated due to gib--low risk  Mechanical VTE Prophylaxis in Place: No    Patient Centered Rounds: I have performed bedside rounds with nursing staff today  Discussions with Specialists or Other Care Team Provider: GI, case mgmt    Education and Discussions with Family / Patient:     Time Spent for Care: 25 minutes  More than 50% of total time spent on counseling and coordination of care as described above  Current Length of Stay: 1 day(s)    Current Patient Status: Inpatient   Certification Statement: The patient will continue to require additional inpatient hospital stay due to EGD, psych eval    Discharge Plan: home in 24 hours? Code Status: Level 1 - Full Code      Subjective:   Patient with ongoing complaints of epigastric abdominal pain  States she has had some abdominal discomfort for a year  Also reports some menstrual type pain in her lower abdomen    She has not had any additional bowel movements but is passing gas  Describes intermittent events of chest pain described mostly as discomfort when taking a deep breath but then reports that she feels better after several deep breathing exercises  Also notes cough with some discolored sputum but no fever  When asked she admits to some tremor and also states that earlier this morning she thought she saw the sign on her closet sliding down, no other unusual events since  Objective:     Vitals:   Temp (24hrs), Av 1 °F (36 7 °C), Min:97 8 °F (36 6 °C), Max:98 3 °F (36 8 °C)    Temp:  [97 8 °F (36 6 °C)-98 3 °F (36 8 °C)] 98 1 °F (36 7 °C)  HR:  [69-91] 78  Resp:  [16-18] 18  BP: ()/(56-75) 123/68  SpO2:  [96 %-100 %] 98 %  Body mass index is 20 35 kg/m²  Input and Output Summary (last 24 hours): Intake/Output Summary (Last 24 hours) at 18 0945  Last data filed at 18 1631   Gross per 24 hour   Intake             1000 ml   Output                0 ml   Net             1000 ml       Physical Exam:     Physical Exam   Constitutional: She appears well-developed and well-nourished  No distress  HENT:   Head: Normocephalic and atraumatic  Eyes: Conjunctivae are normal  Right eye exhibits no discharge  Left eye exhibits no discharge  No scleral icterus  Neck: Neck supple  Cardiovascular: Normal rate, regular rhythm and normal heart sounds  No murmur heard  Pulmonary/Chest: Effort normal and breath sounds normal  No respiratory distress  She has no wheezes  She has no rales  She exhibits no tenderness  Abdominal: Soft  Bowel sounds are normal  She exhibits no distension  There is tenderness (epigastric)  There is no guarding  Musculoskeletal: She exhibits no edema  Neurological: She is alert  Awake alert and interactive  Noted mild left hand tremor  No agitation, delirium, or obvious hallucinations noted   Skin: Skin is warm and dry  No rash noted  She is not diaphoretic  No erythema   No pallor  Healing ecchymosis on legs  Scars on right arm from cutting   Psychiatric:   Very friendly and cooperative   Vitals reviewed  Additional Data:     Labs:      Results from last 7 days  Lab Units 12/27/18  0446 12/26/18  1426   WBC Thousand/uL 9 92 16 09*   HEMOGLOBIN g/dL 10 7* 12 8   HEMATOCRIT % 32 3* 38 3   PLATELETS Thousands/uL 243 303   NEUTROS PCT %  --  79*   LYMPHS PCT %  --  11*   MONOS PCT %  --  8   EOS PCT %  --  1       Results from last 7 days  Lab Units 12/27/18  0446 12/26/18  1426   SODIUM mmol/L 141 142   POTASSIUM mmol/L 3 6 3 8   CHLORIDE mmol/L 109* 105   CO2 mmol/L 23 24   BUN mg/dL 7 6   CREATININE mg/dL 0 60 0 55*   ANION GAP mmol/L 9 13   CALCIUM mg/dL 8 0* 8 7   ALBUMIN g/dL  --  3 8   TOTAL BILIRUBIN mg/dL  --  0 30   ALK PHOS U/L  --  93   ALT U/L  --  22   AST U/L  --  25   GLUCOSE RANDOM mg/dL 92 80                         * I Have Reviewed All Lab Data Listed Above  * Additional Pertinent Lab Tests Reviewed:  Nancie Singh Admission Reviewed    Imaging:    Imaging Reports Reviewed Today Include:   Imaging Personally Reviewed by Myself Includes:      Recent Cultures (last 7 days):           Last 24 Hours Medication List:     Current Facility-Administered Medications:  acetaminophen 650 mg Oral Q6H PRN Aleksey Maruqez MD    calcium carbonate 1,000 mg Oral Daily PRN Aleksey Marquez MD    cloNIDine 0 2 mg Oral BID Aleksey Marquez MD    dextrose 5 % and sodium chloride 0 9 % 100 mL/hr Intravenous Continuous Aleksey Marquez MD Last Rate: 100 mL/hr (12/27/18 0610)   dicyclomine 10 mg Oral 4x Daily (AC & HS) Aleksey Marquez MD    docusate sodium 100 mg Oral BID PRN Aleksey Marquez MD    gabapentin 300 mg Oral Daily Aleksey Marquez MD    gabapentin 600 mg Oral HS Aleksey Marquez MD    guaiFENesin 600 mg Oral Q12H [facility] Jennifer Zelaya PA-C    hydrOXYzine  mg Oral Q6H PRN Aleksey Marquez MD    LORazepam 0 5 mg Intravenous Q4H PRN Jennifer Zelaya PA-C    morphine injection 2 mg Intravenous Q4H PRN Aleksey Marquez MD    nicotine 1 patch Transdermal Daily Aleksey Marquez MD    ondansetron 4 mg Intravenous Q6H PRN Aleksey Marquez MD    pantoprazole 40 mg Intravenous Q12H [address] Aleksey Marquez MD         Today, Patient Was Seen By: Man Adame PA-C    ** Please Note: Dictation voice to text software may have been used in the creation of this document   **

## 2018-12-27 NOTE — H&P
H&P- Michelle Vasquez 1995, 21 y o  female MRN: [id number]    Unit/Bed#: -01 Encounter: [id number]    Primary Care Provider: No primary care provider on file  Date and time admitted to hospital: 12/26/2018  1:47 PM  Abdominal pain   Assessment & Plan    Possible alcoholic gastritis  Also menstrual cramps(as she started having periods since this morning)  Also history of PID    Continue IV PPI  Keep NPO  GI evaluation-ED spoke to GI, plan is to have endoscopy a m  IV fluids  Follow GC and chlamydia sent in ED     Bipolar disorder [facility])   Assessment & Plan    Psychiatric evaluation     H/O self-harm   Assessment & Plan    Patient reports she cut with class on her right wrist due to anxiety and pain  Denies any suicidal ideation     Melena   Assessment & Plan    Reports bleeding per rectum with black stools    GI evaluation requested  Continue to monitor for any further evidence of bleeding  NPO and PPI  Reports that she had colonoscopy and endoscopy several years ago and was told they were normal       VTE Prophylaxis: Ambulate    Code Status:  Full  POLST:   Discussion with family:     Anticipated Length of Stay:  Patient will be admitted on an Inpatient basis with an anticipated length of stay of  > 2 midnights  Justification for Hospital Stay:  Bleeding per rectum psychiatric illness    Total Time for Visit, including Counseling / Coordination of Care: 70 minutes  Greater than 50% of this total time spent on direct patient counseling and coordination of care  Chief Complaint:   Abdominal pain    History of Present Illness:    Michelle Vasquez is a 21 y o  female with bipolar disorder who presents with abdominal pain and black tarry stools  Patient was binge drinking alcohol yesterday, and reports that she approximately had 24 shots of liquor  Then took 1 mg Xanax that was left behind from last prescription  Today developed severe abdominal pain with nausea and vomiting    Reports that she had several episodes of vomiting  Abdominal pain mostly in the lower abdomen, and sometimes in the epigastric region  Denies any hematemesis  Did have bowel movement today which was black and tolerating, also saw some blood  Denies any fever chills or rigors  She is also started having her regular period today  History of psychiatric illness, and inpatient psychiatric admission approximately 1 year ago     Review of Systems:    Review of Systems    Past Medical and Surgical History:     Past Medical History:   Diagnosis Date    Anemia     Anxiety     Asthma     Bipolar 1 disorder ([address] )     Depression     IC (interstitial cystitis)        History reviewed  No pertinent surgical history  Meds/Allergies:    Prior to Admission medications    Medication Sig Start Date End Date Taking? Authorizing Provider   cloNIDine (CATAPRES) 0 2 mg tablet Take 0 2 mg by mouth 2 (two) times a day   Yes Historical Provider, MD   gabapentin (NEURONTIN) 300 mg capsule Take 300 mg by mouth daily   Yes Historical Provider, MD   gabapentin (NEURONTIN) 600 MG tablet Take 600 mg by mouth daily at bedtime   Yes Historical Provider, MD   hydrOXYzine (VISTARIL) 100 MG capsule Take 100 mg by mouth 4 (four) times a day as needed for itching   Yes Historical Provider, MD     I have reviewed home medications with patient personally  Allergies:    Allergies   Allergen Reactions    Penicillins        Social History:     Marital Status: Single   Occupation:  Dancer  Patient Pre-hospital Living Situation:   Patient Pre-hospital Level of Mobility:  Normal independent  Patient Pre-hospital Diet Restrictions:  None  Substance Use History:   History   Alcohol Use    Yes     History   Smoking Status    Current Every Day Smoker   Smokeless Tobacco    Never Used     History   Drug Use No       Family History:    non-contributory    Physical Exam:     Vitals:   Blood Pressure: 106/60 (12/26/18 1937)  Pulse: 80 (12/26/18 1937)  Temperature: 98 3 °F (36 8 °C) (12/26/18 1937)  Temp Source: Oral (12/26/18 1937)  Respirations: 16 (12/26/18 1937)  Height: 5' 3" (160 cm) (12/26/18 1937)  Weight - Scale: 52 1 kg (114 lb 13 8 oz) (12/26/18 1937)  SpO2: 99 % (12/26/18 1937)    Physical Exam     Gen -Patient comfortable   Neck- Supple  No thyromegaly or lymphadenopathy  Lungs-Clear bilaterally without any wheeze or rales   Heart S1-S2, regular rate and rhythm, no murmurs  Abdomen-soft nontender, no organomegaly  Bowel sounds present  Extremities-no cyanosi,  clubbing or edema  Skin- multiple superficial cut marks on right forearm  Neuro-nonfocal         Additional Data:     Lab Results: I have personally reviewed pertinent reports  Results from last 7 days  Lab Units 12/26/18  1426   WBC Thousand/uL 16 09*   HEMOGLOBIN g/dL 12 8   HEMATOCRIT % 38 3   PLATELETS Thousands/uL 303   NEUTROS PCT % 79*   LYMPHS PCT % 11*   MONOS PCT % 8   EOS PCT % 1       Results from last 7 days  Lab Units 12/26/18  1426   SODIUM mmol/L 142   POTASSIUM mmol/L 3 8   CHLORIDE mmol/L 105   CO2 mmol/L 24   BUN mg/dL 6   CREATININE mg/dL 0 55*   ANION GAP mmol/L 13   CALCIUM mg/dL 8 7   ALBUMIN g/dL 3 8   TOTAL BILIRUBIN mg/dL 0 30   ALK PHOS U/L 93   ALT U/L 22   AST U/L 25   GLUCOSE RANDOM mg/dL 80                       Imaging: I have personally reviewed pertinent reports  CT abdomen pelvis with contrast   Final Result by Maulik Wisdom MD (12/26 2109)      No acute findings  Workstation performed: [id number]         XR chest 2 views   Final Result by Felicia Clayton MD (12/26 7722)      No active pulmonary disease  Workstation performed: [id number]             EKG, Pathology, and Other Studies Reviewed on Admission:   · EKG:     Allscripts / Epic Records Reviewed: Yes     ** Please Note: This note has been constructed using a voice recognition system   **

## 2018-12-27 NOTE — ASSESSMENT & PLAN NOTE
· Patient reports she cut with glass on her right wrist due to anxiety and pain   Denies any suicidal ideation-psych eval

## 2018-12-27 NOTE — ASSESSMENT & PLAN NOTE
Reports bleeding per rectum with black stools    GI evaluation requested  Continue to monitor for any further evidence of bleeding  NPO and PPI  Reports that she had colonoscopy and endoscopy several years ago and was told they were normal

## 2018-12-27 NOTE — PROGRESS NOTES
Pt seen at GI bay in [place] area  She admits to having productive green sputum for 3-4 days time, and sinus congestion  She has productive cough but no sore throat  She has a hx of reactive airway and asthma as well  Upon auscultation bilateral anterior aspects of lungs, lung sounds are decreased at bases  She is not febrile  Concern with upper respiratory illness (viral vs  Bacterial) vs  Aspiration in setting of asthma  H/H is relatively stable since admission, pt has not admitted to [facility] today or melanotic stools  Discussed case with Dr Rohit Rajput and [facility] (Dr Selwyn Price)  Pt may be at risk of bronchospasm under IV sedation given productive green sputum vs  Aspiration process (pt took 24 tequila shots, xanax, and naproxen all at once)    - please hold EGD procedure today  - obtain serial CXR to monitor for aspiration risk  - consider sputum culture to determine if bacterial or viral etiology of respiratory illness/sinusitis  - should pt have melanotic stools and continue to have drop in Hct, EGD may be performed more urgently

## 2018-12-28 VITALS
RESPIRATION RATE: 16 BRPM | HEIGHT: 63 IN | TEMPERATURE: 98.1 F | SYSTOLIC BLOOD PRESSURE: 95 MMHG | HEART RATE: 66 BPM | DIASTOLIC BLOOD PRESSURE: 51 MMHG | OXYGEN SATURATION: 100 % | BODY MASS INDEX: 20.35 KG/M2 | WEIGHT: 114.86 LBS

## 2018-12-28 PROBLEM — K92.1 MELENA: Status: RESOLVED | Noted: 2018-12-26 | Resolved: 2018-12-28

## 2018-12-28 PROBLEM — Z72.0 TOBACCO ABUSE: Status: ACTIVE | Noted: 2018-12-28

## 2018-12-28 PROBLEM — R30.0 DYSURIA: Status: ACTIVE | Noted: 2018-12-28

## 2018-12-28 PROBLEM — R10.9 ABDOMINAL PAIN: Status: RESOLVED | Noted: 2018-12-26 | Resolved: 2018-12-28

## 2018-12-28 LAB
BASOPHILS # BLD AUTO: 0.03 THOUSANDS/ΜL (ref 0–0.1)
BASOPHILS NFR BLD AUTO: 0 % (ref 0–1)
EOSINOPHIL # BLD AUTO: 0.19 THOUSAND/ΜL (ref 0–0.61)
EOSINOPHIL NFR BLD AUTO: 2 % (ref 0–6)
ERYTHROCYTE [DISTWIDTH] IN BLOOD BY AUTOMATED COUNT: 15.2 % (ref 11.6–15.1)
HCT VFR BLD AUTO: 35.8 % (ref 34.8–46.1)
HGB BLD-MCNC: 11.8 G/DL (ref 11.5–15.4)
IMM GRANULOCYTES # BLD AUTO: 0.03 THOUSAND/UL (ref 0–0.2)
IMM GRANULOCYTES NFR BLD AUTO: 0 % (ref 0–2)
LYMPHOCYTES # BLD AUTO: 1.6 THOUSANDS/ΜL (ref 0.6–4.47)
LYMPHOCYTES NFR BLD AUTO: 19 % (ref 14–44)
MCH RBC QN AUTO: 30.2 PG (ref 26.8–34.3)
MCHC RBC AUTO-ENTMCNC: 33 G/DL (ref 31.4–37.4)
MCV RBC AUTO: 92 FL (ref 82–98)
MONOCYTES # BLD AUTO: 0.69 THOUSAND/ΜL (ref 0.17–1.22)
MONOCYTES NFR BLD AUTO: 8 % (ref 4–12)
NEUTROPHILS # BLD AUTO: 6.06 THOUSANDS/ΜL (ref 1.85–7.62)
NEUTS SEG NFR BLD AUTO: 71 % (ref 43–75)
NRBC BLD AUTO-RTO: 0 /100 WBCS
PLATELET # BLD AUTO: 287 THOUSANDS/UL (ref 149–390)
PMV BLD AUTO: 8.9 FL (ref 8.9–12.7)
RBC # BLD AUTO: 3.91 MILLION/UL (ref 3.81–5.12)
WBC # BLD AUTO: 8.6 THOUSAND/UL (ref 4.31–10.16)

## 2018-12-28 PROCEDURE — 99254 IP/OBS CNSLTJ NEW/EST MOD 60: CPT | Performed by: PSYCHIATRY & NEUROLOGY

## 2018-12-28 PROCEDURE — 99232 SBSQ HOSP IP/OBS MODERATE 35: CPT | Performed by: PHYSICIAN ASSISTANT

## 2018-12-28 PROCEDURE — 99232 SBSQ HOSP IP/OBS MODERATE 35: CPT | Performed by: INTERNAL MEDICINE

## 2018-12-28 PROCEDURE — 85025 COMPLETE CBC W/AUTO DIFF WBC: CPT | Performed by: PHYSICIAN ASSISTANT

## 2018-12-28 PROCEDURE — 99239 HOSP IP/OBS DSCHRG MGMT >30: CPT | Performed by: PHYSICIAN ASSISTANT

## 2018-12-28 RX ORDER — POLYETHYLENE GLYCOL 3350 17 G/17G
17 POWDER, FOR SOLUTION ORAL DAILY
Qty: 14 EACH | Refills: 0
Start: 2018-12-29 | End: 2019-01-13

## 2018-12-28 RX ORDER — POLYETHYLENE GLYCOL 3350 17 G/17G
17 POWDER, FOR SOLUTION ORAL DAILY
Status: DISCONTINUED | OUTPATIENT
Start: 2018-12-28 | End: 2018-12-28 | Stop reason: HOSPADM

## 2018-12-28 RX ORDER — DOCUSATE SODIUM 100 MG/1
100 CAPSULE, LIQUID FILLED ORAL 2 TIMES DAILY PRN
Qty: 10 CAPSULE | Refills: 0
Start: 2018-12-28 | End: 2019-01-13

## 2018-12-28 RX ORDER — FOLIC ACID 1 MG/1
1 TABLET ORAL DAILY
Qty: 30 TABLET | Refills: 0
Start: 2018-12-28 | End: 2019-01-13

## 2018-12-28 RX ORDER — GABAPENTIN 300 MG/1
300 CAPSULE ORAL DAILY
Qty: 90 CAPSULE | Refills: 0 | Status: SHIPPED | OUTPATIENT
Start: 2018-12-28

## 2018-12-28 RX ORDER — GUAIFENESIN 600 MG
600 TABLET, EXTENDED RELEASE 12 HR ORAL EVERY 12 HOURS SCHEDULED
Qty: 10 TABLET | Refills: 0
Start: 2018-12-28

## 2018-12-28 RX ORDER — ASCORBIC ACID 250 MG
250 TABLET ORAL DAILY
Qty: 30 TABLET | Refills: 0
Start: 2018-12-28 | End: 2019-01-13

## 2018-12-28 RX ORDER — FERROUS SULFATE 325(65) MG
325 TABLET ORAL
Qty: 30 TABLET | Refills: 0 | Status: SHIPPED | OUTPATIENT
Start: 2018-12-28

## 2018-12-28 RX ORDER — ASCORBIC ACID 500 MG
250 TABLET ORAL DAILY
Status: DISCONTINUED | OUTPATIENT
Start: 2018-12-28 | End: 2018-12-28 | Stop reason: HOSPADM

## 2018-12-28 RX ORDER — ONDANSETRON 4 MG/1
4 TABLET, ORALLY DISINTEGRATING ORAL EVERY 6 HOURS PRN
Qty: 20 TABLET | Refills: 0 | Status: SHIPPED | OUTPATIENT
Start: 2018-12-28

## 2018-12-28 RX ORDER — HYDROXYZINE PAMOATE 100 MG/1
100 CAPSULE ORAL 4 TIMES DAILY PRN
Qty: 30 CAPSULE | Refills: 0 | Status: CANCELLED | OUTPATIENT
Start: 2018-12-28

## 2018-12-28 RX ORDER — SUCRALFATE ORAL 1 G/10ML
1000 SUSPENSION ORAL EVERY 6 HOURS SCHEDULED
Qty: 420 ML | Refills: 0 | Status: SHIPPED | OUTPATIENT
Start: 2018-12-28 | End: 2019-01-13

## 2018-12-28 RX ORDER — SUCRALFATE ORAL 1 G/10ML
1000 SUSPENSION ORAL EVERY 6 HOURS SCHEDULED
Status: DISCONTINUED | OUTPATIENT
Start: 2018-12-28 | End: 2018-12-28 | Stop reason: HOSPADM

## 2018-12-28 RX ORDER — FOLIC ACID 1 MG/1
1 TABLET ORAL DAILY
Status: DISCONTINUED | OUTPATIENT
Start: 2018-12-28 | End: 2018-12-28 | Stop reason: HOSPADM

## 2018-12-28 RX ORDER — ALBUTEROL SULFATE 90 UG/1
2 AEROSOL, METERED RESPIRATORY (INHALATION) EVERY 4 HOURS PRN
Qty: 1 INHALER | Refills: 0 | Status: SHIPPED | OUTPATIENT
Start: 2018-12-28 | End: 2019-01-13

## 2018-12-28 RX ORDER — PANTOPRAZOLE SODIUM 40 MG/1
40 TABLET, DELAYED RELEASE ORAL
Qty: 60 TABLET | Refills: 0 | Status: SHIPPED | OUTPATIENT
Start: 2018-12-28 | End: 2019-01-13

## 2018-12-28 RX ORDER — FERROUS SULFATE 325(65) MG
325 TABLET ORAL
Status: DISCONTINUED | OUTPATIENT
Start: 2018-12-28 | End: 2018-12-28 | Stop reason: HOSPADM

## 2018-12-28 RX ORDER — ACETAMINOPHEN 325 MG/1
650 TABLET ORAL EVERY 6 HOURS PRN
Qty: 30 TABLET | Refills: 0
Start: 2018-12-28

## 2018-12-28 RX ORDER — CLONIDINE HYDROCHLORIDE 0.2 MG/1
0.2 TABLET ORAL 2 TIMES DAILY
Qty: 20 TABLET | Refills: 0 | Status: SHIPPED | OUTPATIENT
Start: 2018-12-28 | End: 2019-01-08 | Stop reason: SDUPTHER

## 2018-12-28 RX ORDER — NICOTINE 21 MG/24HR
1 PATCH, TRANSDERMAL 24 HOURS TRANSDERMAL EVERY 24 HOURS
Qty: 28 PATCH | Refills: 0 | Status: SHIPPED | OUTPATIENT
Start: 2018-12-28 | End: 2019-01-13

## 2018-12-28 RX ORDER — PANTOPRAZOLE SODIUM 40 MG/1
40 TABLET, DELAYED RELEASE ORAL
Status: DISCONTINUED | OUTPATIENT
Start: 2018-12-28 | End: 2018-12-28 | Stop reason: HOSPADM

## 2018-12-28 RX ADMIN — POLYETHYLENE GLYCOL 3350 17 G: 17 POWDER, FOR SOLUTION ORAL at 10:41

## 2018-12-28 RX ADMIN — OXYCODONE HYDROCHLORIDE AND ACETAMINOPHEN 250 MG: 500 TABLET ORAL at 09:11

## 2018-12-28 RX ADMIN — ONDANSETRON 4 MG: 2 INJECTION INTRAMUSCULAR; INTRAVENOUS at 06:48

## 2018-12-28 RX ADMIN — SUCRALFATE 1000 MG: 1 SUSPENSION ORAL at 09:14

## 2018-12-28 RX ADMIN — FOLIC ACID 1 MG: 1 TABLET ORAL at 09:10

## 2018-12-28 RX ADMIN — LORAZEPAM 2 MG: 1 TABLET ORAL at 00:16

## 2018-12-28 RX ADMIN — PHENAZOPYRIDINE HYDROCHLORIDE 100 MG: 100 TABLET ORAL at 10:41

## 2018-12-28 RX ADMIN — PANTOPRAZOLE SODIUM 40 MG: 40 TABLET, DELAYED RELEASE ORAL at 09:14

## 2018-12-28 RX ADMIN — DICYCLOMINE HYDROCHLORIDE 10 MG: 10 CAPSULE ORAL at 06:37

## 2018-12-28 RX ADMIN — GUAIFENESIN 600 MG: 600 TABLET, EXTENDED RELEASE ORAL at 09:11

## 2018-12-28 RX ADMIN — FERROUS SULFATE TAB 325 MG (65 MG ELEMENTAL FE) 325 MG: 325 (65 FE) TAB at 09:11

## 2018-12-28 RX ADMIN — LORAZEPAM 0.5 MG: 2 INJECTION INTRAMUSCULAR; INTRAVENOUS at 10:41

## 2018-12-28 RX ADMIN — MORPHINE SULFATE 2 MG: 2 INJECTION, SOLUTION INTRAMUSCULAR; INTRAVENOUS at 06:48

## 2018-12-28 RX ADMIN — GABAPENTIN 300 MG: 300 CAPSULE ORAL at 09:10

## 2018-12-28 RX ADMIN — NICOTINE 1 PATCH: 7 PATCH TRANSDERMAL at 09:11

## 2018-12-28 RX ADMIN — SUCRALFATE 1000 MG: 1 SUSPENSION ORAL at 14:45

## 2018-12-28 NOTE — PROGRESS NOTES
Progress Note - Kasey Garcia 21 y o  female MRN: [id number]    Unit/Bed#: -01 Encounter: [id number]        ASSESSMENT/ PLAN:   Active Problems:    Bipolar disorder ([address] )    H/O self-harm    Anemia    Tobacco abuse    Dysuria       N/V   Epigastric pain   Melena  -After consuming 24 shots of tequila, she had been abstinent for several months prior to this, she developed multiple episodes of n/v, epigastric pain and had 1 episode of melena without recurrence   -symptoms most likely secondary to alcoholic gastritis/MWT  -EGD was deferred given cough with phlegm yesterday  -Her menstrual cramps are likely contributing to abdominal pain as well   -she overall has improved without any further melena, given this will hold off on EGD and continue with PPI BID, carafate  -she feels constipated and will trial miralax  -f/u H/H      Subjective:     Geo Ram feels okay, she is tolerating a soft diet without any increase in pain  She feels like she needs to have a bm and cannot  No further vomiting    Objective:     Vitals: Blood pressure 95/51, pulse 66, temperature 98 1 °F (36 7 °C), temperature source Oral, resp  rate 16, height 5' 3" (1 6 m), weight 52 1 kg (114 lb 13 8 oz), last menstrual period 12/26/2018, SpO2 100 %  ,Body mass index is 20 35 kg/m²        Intake/Output Summary (Last 24 hours) at 12/28/18 1006  Last data filed at 12/27/18 1301   Gross per 24 hour   Intake          1688 33 ml   Output                0 ml   Net          1688 33 ml       Physical Exam:   General Appearance: Alert, appears stated age and cooperative  Lungs: Clear to auscultation bilaterally  Heart: Regular rate and rhythm  Abdomen: Soft, non-tender, non-distended; bowel sounds normal  Extremities: No cyanosis, edema    Invasive Devices     Peripheral Intravenous Line            Peripheral IV 12/26/18 Right Antecubital 1 day                Lab Results:      Results from last 7 days  Lab Units 12/27/18  0446 12/26/18  1426   WBC Thousand/uL 9 92 16 09*   HEMOGLOBIN g/dL 10 7* 12 8   HEMATOCRIT % 32 3* 38 3   PLATELETS Thousands/uL 243 303   NEUTROS PCT %  --  79*   LYMPHS PCT %  --  11*   MONOS PCT %  --  8   EOS PCT %  --  1       Results from last 7 days  Lab Units 12/27/18  0446 12/26/18  1426   POTASSIUM mmol/L 3 6 3 8   CHLORIDE mmol/L 109* 105   CO2 mmol/L 23 24   BUN mg/dL 7 6   CREATININE mg/dL 0 60 0 55*   CALCIUM mg/dL 8 0* 8 7   ALK PHOS U/L  --  93   ALT U/L  --  22   AST U/L  --  25           Results from last 7 days  Lab Units 12/26/18  1426   LIPASE u/L 102       Imaging Studies: I have personally reviewed pertinent imaging studies  Xr Chest Pa & Lateral    Result Date: 12/28/2018  Impression: No acute cardiopulmonary disease  Workstation performed: [id number]     Xr Chest 2 Views    Result Date: 12/26/2018  Impression: No active pulmonary disease  Workstation performed: [id number]     Ct Abdomen Pelvis With Contrast    Result Date: 12/26/2018  Impression: No acute findings   Workstation performed: [id number]

## 2018-12-28 NOTE — PLAN OF CARE
Problem: DISCHARGE PLANNING - CARE MANAGEMENT  Goal: Discharge to post-acute care or home with appropriate resources  INTERVENTIONS:  - Conduct assessment to determine patient/family and health care team treatment goals, and need for post-acute services based on payer coverage, community resources, and patient preferences, and barriers to discharge  - Address psychosocial, clinical, and financial barriers to discharge as identified in assessment in conjunction with the patient/family and health care team  - Arrange appropriate level of post-acute services according to patients   needs and preference and payer coverage in collaboration with the physician and health care team  - Communicate with and update the patient/family, physician, and health care team regarding progress on the discharge plan  - Arrange appropriate transportation to post-acute venues  Outcome: Completed Date Met: 12/28/18  LOS 2 DAYS  PATIENT IS NOT A BUNDLE  PATIENT IS NOT A READMISSION  CM met with patient at bedside, patient alert and oriented  Patient resides with her exboyfriend in a 2 story home in [facility]; patient is able to navigate her home without concern  Patient moved to [facility] 1 month ago from [place] and has not yet been established with local providers  Patient denies the use of DME and is independent with all ADLs  Patient denies history of VNA/STR  Patient will be utilizing [facility], has Rx plan and is able to afford all copays  Patient does confirm mental health history and is interested [facility]; resources provided  Patient also intersted in PCP and is agreeable to travel to hospital [place]  CARLY appointment schedule with Dr Maddi Morelos for 1/7 at 12:30 pm  Patient and providers aware of CARLY appointment  Patient also requested and received shelter/transitional housing information  Patient denies POA/AD and is not interested in information but identified her father Víctor Lockett as her HCR   Patient is employed and has her license but no car  Patient reports her ex-boyfriend or aunt provide all transportation and will do so upon discharge  Patient does not appear to have any other needs at this time  CM Department will continue to assess for needs and will follow through discharge  CM reviewed discharge planning process including the following: identifying caregivers at home, preference for d/c planning needs, availability of treatment team to discuss questions or concerns patient and/or family may have regarding diagnosis, plan of care, old or new medications and discharge planning   CM will continue to follow for care coordination and update assessment as necessary

## 2018-12-28 NOTE — SOCIAL WORK
LOS 2 DAYS  PATIENT IS NOT A BUNDLE  PATIENT IS NOT A READMISSION  CM met with patient at bedside, patient alert and oriented  Patient resides with her exboyfriend in a 2 story home in [place]; patient is able to navigate her home without concern  Patient moved to [place] 1 month ago from [place] and has not yet been established with local providers  Patient denies the use of DME and is independent with all ADLs  Patient denies history of VNA/STR  Patient will be utilizing [address], has Rx plan and is able to afford all copays  Patient does confirm mental health history and is interested in OP [address] resources; resources provided  Patient also intersted in PCP and is agreeable to travel to [facility]  CARLY appointment schedule with Dr Cirilo Quevedo for 1/7 at 12:30 pm  Patient and providers aware of CARLY appointment  Patient also requested and received shelter/transitional housing information  Patient denies POA/AD and is not interested in information but identified her father Kacey Yanez as her HCR  Patient is employed and has her license but no car  Patient reports her ex-boyfriend or aunt provide all transportation and will do so upon discharge  Patient does not appear to have any other needs at this time  CM Department will continue to assess for needs and will follow through discharge  CM reviewed discharge planning process including the following: identifying caregivers at home, preference for d/c planning needs, availability of treatment team to discuss questions or concerns patient and/or family may have regarding diagnosis, plan of care, old or new medications and discharge planning   CM will continue to follow for care coordination and update assessment as necessary

## 2018-12-28 NOTE — SOCIAL WORK
Received call from [facility] stating that prior auth is needed for protonix d/t BID  S/w Jennifer THAPA agreed to daily  [facility] aware   Meds to be delivered

## 2018-12-28 NOTE — ASSESSMENT & PLAN NOTE
· Patient reports she cut with glass on her right wrist due to anxiety and pain  Denies any suicidal ideation-psych eval pending  · Renew clonidine Rx-patient reports she ran out one 1 week ago and could not get refill and that is why she started drinking   She states she has been on it for years and it helps her anxiety

## 2018-12-28 NOTE — SOCIAL WORK
S/w destin from [facility] on MS2 who states patient's medication total was over $100 and refused many of her medications inculiding Carafate solution as it was $50  Jennifer THAPA made aware and called [facility] to change carafate solution for tablets  New copay $7 and pt accepted but states that she doesn't have enough money to pay for a ride home and nobody to pick her up  LYFT approved d/t above and weather conditions  Waiver signed, address obtain   amy s/w Vinod at [facility] to set up LYFT for 5pm , nurse and patient aware
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
## 2018-12-28 NOTE — DISCHARGE SUMMARY
Discharging Physician / Practitioner: Rocio Torres PA-C  PCP: No primary care provider on file  Admission Date:   Admission Orders     Ordered        12/26/18 [address]  Inpatient Admission (expected length of stay for this patient is greater than two midnights)  Once             Discharge Date: 12/28/18    Resolved Problems  Date Reviewed: 12/28/2018          Resolved    * (Principal)Abdominal pain 12/28/2018     Resolved by  Rocio Torres PA-C    Melena 12/28/2018     Resolved by  Rocio Torres PA-C        Consultations During Hospital Stay:  · GI  · psych    Procedures Performed:     · Chest x-ray x2 normal  · CT scan of the abdomen and pelvis normal    Significant Findings / Test Results:     · None    Incidental Findings:   · None     Test Results Pending at Discharge (will require follow up): · None     Outpatient Tests Requested:  · None    Complications:  Lack of appropriate follow-up, alcohol abuse    Reason for Admission:  Abdominal pain    Hospital Course:     Britney Costello is a 21 y o  female patient who originally presented to the hospital on 12/26/2018 due to abdominal pain and reports of black colored stools  Patient had been heavily been drinking 24 shots of alcohol recently  She reports to me that she started drinking heavily because she ran out of clonidine which she normally takes for anxiety and does not have a local psychiatrist anymore  She was observed in the hospital and seen by Gastroenterology with initial plans to do an EGD  Anesthesiology did not feel comfortable proceeding with the EGD on the afternoon it was planned because of the patient's subjective complaints of green sputum and reported underlying asthma  However, patient has had 2 normal chest x-rays, normal lung exam for me, and has had no events of hypoxia  Nonetheless, it was felt that EGD was no longer necessary as the patient clinically stabilized and probably had a transient Carol Ann-Reyes tear related to alcohol abuse  Repeat CBC today actually was better  It was also noted that her folate and ferritin levels were low and she was placed back on supplementation  She was placed on Protonix and Carafate, prn zofran ODT and was counseled to have tobacco and alcohol cessation  She also has underlying bipolar disorder and self-harming/cutting behaviors  She was seen by Psychiatry who recommended continuing her prior meds which we provided to her and outpatient follow up  Please see above list of diagnoses and related plan for additional information  Condition at Discharge: fair     Discharge Day Visit / Exam:     * Please refer to separate progress note for these details *    Discussion with Family:     Discharge instructions/Information to patient and family:   See after visit summary for information provided to patient and family  Provisions for Follow-Up Care:  See after visit summary for information related to follow-up care and any pertinent home health orders  Disposition:     Home    For Discharges to [facility]:   · Not Applicable to this Patient - Not Applicable to this Patient    Planned Readmission: none     Discharge Statement:  I spent 45 minutes discharging the patient  This time was spent on the day of discharge  I had direct contact with the patient on the day of discharge  Greater than 50% of the total time was spent examining patient, answering all patient questions, arranging and discussing plan of care with patient as well as directly providing post-discharge instructions  Additional time then spent on discharge activities  Discharge Medications:  See after visit summary for reconciled discharge medications provided to patient and family        ** Please Note: This note has been constructed using a voice recognition system **

## 2018-12-28 NOTE — PROGRESS NOTES
Progress Note - Cricket Douglas 1995, 21 y o  female MRN: [id number]    Unit/Bed#: -01 Encounter: [id number]    Primary Care Provider: No primary care provider on file  Date and time admitted to hospital: 12/26/2018  1:47 PM  * Abdominal pain   Assessment & Plan    · POA abdominal pain with ?melena--Probable alcoholic gastritis given binge drinking of 24 shots--clinically stable; recheck CBC now  · Also menstrual cramps(as she started her period)- now resolved--add aqua K pad; avoid NSAIDS  · Also history of PID  · discontinue use of narcotics  · Continue PPI BID, change from IV to PO  · Add carafate  · GI evaluation appreciated, EGD  · Diet resumed--stop IV fluids       Bipolar disorder ([facility] [address] )   Assessment & Plan    · Psychiatric evaluation; hold parameters on clonidine to prevent hypotension     Anemia   Assessment & Plan    · Chronic anemia-- iron panel noted low ferritin and folate levels--replete     Dysuria   Assessment & Plan    · Yesterday requested pyridium for dysuria  NO UTI (normal UA)     H/O self-harm   Assessment & Plan    · Patient reports she cut with glass on her right wrist due to anxiety and pain  Denies any suicidal ideation-psych eval pending  · Renew clonidine Rx-patient reports she ran out one 1 week ago and could not get refill and that is why she started drinking  She states she has been on it for years and it helps her anxiety     Tobacco abuse   Assessment & Plan    · Recommend tobacco cessation and nicotine patch  · Patient with subjective complaints of green phlegm and some chest discomfort with taking a deep breath  However, she has no sepsis criteria, normal lung exam for me for 2 consecutive days without any wheezing, no hypoxia, and normal chest x-ray x2  I do not feel she has any significant pulmonary issues at this time    I would recommend that she quit smoking and we can certainly keep her on prn albuterol and mucinex for possible viral URI       VTE Pharmacologic Prophylaxis:   Pharmacologic: Low risk  Mechanical VTE Prophylaxis in Place: No    Patient Centered Rounds: I have performed bedside rounds with nursing staff today  Discussions with Specialists or Other Care Team Provider:  Psychiatry, case management, GI    Education and Discussions with Family / Patient:     Time Spent for Care: 30 minutes  More than 50% of total time spent on counseling and coordination of care as described above  Current Length of Stay: 2 day(s)    Current Patient Status: Inpatient   Certification Statement: The patient will continue to require additional inpatient hospital stay due to Awaiting psych consult    Discharge Plan:  Patient should be discharged today if cleared by Psychiatry and stable cbc    Code Status: Level 1 - Full Code      Subjective:   Patient reports that her menstrual cramps have completely resolved  She has not had any black or bloody bowel movements or episodes of vomiting  She reports to me that she had run out of clonidine a week before her hospital admission which is why she started drinking  She states it is the only thing that helps a lot with anxiety but she does not have any local doctors to prescribe it now  She continues to report some discolored phlegm however nursing reports her O2 sats have been completely stable and no respiratory distress has been noted    Objective:     Vitals:   Temp (24hrs), Av 1 °F (36 7 °C), Min:97 3 °F (36 3 °C), Max:98 4 °F (36 9 °C)    Temp:  [97 3 °F (36 3 °C)-98 4 °F (36 9 °C)] 98 1 °F (36 7 °C)  HR:  [66-92] 66  Resp:  [16-18] 16  BP: ()/(51-76) 95/51  SpO2:  [99 %-100 %] 100 %  Body mass index is 20 35 kg/m²  Input and Output Summary (last 24 hours):        Intake/Output Summary (Last 24 hours) at 18 0847  Last data filed at 18 1301   Gross per 24 hour   Intake          1688 33 ml   Output                0 ml   Net          1688 33 ml       Physical Exam:     Physical Exam   Constitutional: She appears well-developed and well-nourished  No distress  Sleeping comfortably in bed in no distress   HENT:   Head: Normocephalic  Eyes: Conjunctivae are normal  Right eye exhibits no discharge  Left eye exhibits no discharge  No scleral icterus  Neck: Neck supple  Cardiovascular: Normal rate, regular rhythm and normal heart sounds  No murmur heard  Pulmonary/Chest: Effort normal and breath sounds normal  No respiratory distress  She has no wheezes  She has no rales  She exhibits no tenderness  No supplemental oxygen in place  No cough noted at all during my entire evaluation  No evidence of dyspnea or tachypnea  No wheezes  Abdominal: Soft  She exhibits no distension  There is no tenderness  There is no rebound and no guarding  Thin soft abdomen with no tenderness to palpation noted   Musculoskeletal: She exhibits no edema  Neurological: She is alert  Awake alert and interactive   Skin: Skin is warm and dry  No rash noted  She is not diaphoretic  No erythema  No pallor  Healing incisions on right wrist   Psychiatric: She has a normal mood and affect  Vitals reviewed        Additional Data:     Labs:      Results from last 7 days  Lab Units 12/27/18  0446 12/26/18  1426   WBC Thousand/uL 9 92 16 09*   HEMOGLOBIN g/dL 10 7* 12 8   HEMATOCRIT % 32 3* 38 3   PLATELETS Thousands/uL 243 303   NEUTROS PCT %  --  79*   LYMPHS PCT %  --  11*   MONOS PCT %  --  8   EOS PCT %  --  1       Results from last 7 days  Lab Units 12/27/18  0446 12/26/18  1426   SODIUM mmol/L 141 142   POTASSIUM mmol/L 3 6 3 8   CHLORIDE mmol/L 109* 105   CO2 mmol/L 23 24   BUN mg/dL 7 6   CREATININE mg/dL 0 60 0 55*   ANION GAP mmol/L 9 13   CALCIUM mg/dL 8 0* 8 7   ALBUMIN g/dL  --  3 8   TOTAL BILIRUBIN mg/dL  --  0 30   ALK PHOS U/L  --  93   ALT U/L  --  22   AST U/L  --  25   GLUCOSE RANDOM mg/dL 92 80                   Results from last 7 days  Lab Units 12/27/18  1850   PROCALCITONIN ng/ml <0 05       * I Have Reviewed All Lab Data Listed Above  * Additional Pertinent Lab Tests Reviewed: [address] Admission Reviewed    Imaging:    Imaging Reports Reviewed Today Include: cxr  Imaging Personally Reviewed by Myself Includes:      Recent Cultures (last 7 days):           Last 24 Hours Medication List:     Current Facility-Administered Medications:  acetaminophen 650 mg Oral Q6H PRN Aleksey Marquez MD   albuterol 2 puff Inhalation Q4H PRN Jennifer Zelaya PA-C   ascorbic acid 250 mg Oral Daily Jennifer Zelaya PA-C   calcium carbonate 1,000 mg Oral Daily PRN Aleksey Marquez MD   cloNIDine 0 2 mg Oral BID Aleksey Marquez MD   dicyclomine 10 mg Oral 4x Daily (AC & HS) Aleksey Marquez MD   docusate sodium 100 mg Oral BID PRN Aleksey Marquez MD   ferrous sulfate 325 mg Oral Daily With Breakfast Jennifer Zelaya PA-C   folic acid 1 mg Oral Daily Jennifer Zelaya PA-C   gabapentin 300 mg Oral Daily Aleksey Marquez MD   gabapentin 600 mg Oral HS Aleksey Marquez MD   guaiFENesin 600 mg Oral Q12H [address] Jennifer Zelaya PA-C   hydrOXYzine  mg Oral Q6H PRN Aleksey Maruqez MD   LORazepam 0 5 mg Intravenous Q4H PRN Jennifer Zelaya PA-C   nicotine 1 patch Transdermal Daily Aleksey Marquez MD   ondansetron 4 mg Intravenous Q6H PRN Aleksey Marquez MD   pantoprazole 40 mg Oral BID AC Jennifer Zelaya PA-C   phenazopyridine 100 mg Oral TID PRN Jennifer Zelaya PA-C   sucralfate 1,000 mg Oral Q6H [address] Jennifer Zelaya PA-C        Today, Patient Was Seen By: Felicia Avila PA-C    ** Please Note: Dictation voice to text software may have been used in the creation of this document   **

## 2018-12-28 NOTE — ASSESSMENT & PLAN NOTE
· POA abdominal pain with ?melena--Probable alcoholic gastritis given binge drinking of 24 shots--clinically stable; recheck CBC now  · Also menstrual cramps(as she started her period)- now resolved--add aqua K pad; avoid NSAIDS  · Also history of PID  · discontinue use of narcotics  · Continue PPI BID, change from IV to PO  · Add carafate  · GI evaluation appreciated, EGD  · Diet resumed--stop IV fluids

## 2018-12-28 NOTE — UTILIZATION REVIEW
Continued Stay Review    Date: 12/28/2018    Vital Signs: BP 95/51 (BP Location: Left arm)   Pulse 66   Temp 98 1 °F (36 7 °C) (Oral)   Resp 16   Ht 5' 3" (1 6 m)   Wt 52 1 kg (114 lb 13 8 oz)   LMP 12/26/2018   SpO2 100%   BMI 20 35 kg/m²     Medications:   Scheduled Meds:   Current Facility-Administered Medications:  acetaminophen 650 mg Oral Q6H PRN   albuterol 2 puff Inhalation Q4H PRN   ascorbic acid 250 mg Oral Daily   calcium carbonate 1,000 mg Oral Daily PRN   cloNIDine 0 2 mg Oral BID   dicyclomine 10 mg Oral 4x Daily (AC & HS)   docusate sodium 100 mg Oral BID PRN   ferrous sulfate 325 mg Oral Daily With Breakfast   folic acid 1 mg Oral Daily   gabapentin 300 mg Oral Daily   gabapentin 600 mg Oral HS   guaiFENesin 600 mg Oral Q12H HAKAN   hydrOXYzine  mg Oral Q6H PRN   LORazepam 0 5 mg Intravenous Q4H PRN   nicotine 1 patch Transdermal Daily   ondansetron 4 mg Intravenous Q6H PRN   pantoprazole 40 mg Oral BID AC   phenazopyridine 100 mg Oral TID PRN   polyethylene glycol 17 g Oral Daily   sucralfate 1,000 mg Oral Q6H [facility]     Continuous Infusions:    PRN Meds:  morphine 2 mg iv - used x 1 12/28  Used x 3 12 27 acetaminophen    Ondansetron 4 mg iv - used x 1 12/28  And used x 2 12/17    Abnormal Labs/Diagnostic Results:   egd deferred given cough with phlegm       CxR- no acute cardiopulmonary disease     Age/Sex: 21 y o  female     Assessment/Plan:  Psyche consult not done yet   * Abdominal pain   Assessment & Plan     · POA abdominal pain with ?melena--Probable alcoholic gastritis given binge drinking of 24 shots--clinically stable; recheck CBC now  · Also menstrual cramps(as she started her period)- now resolved--add aqua K pad; avoid NSAIDS  · Also history of PID  · discontinue use of narcotics  · Continue PPI BID, change from IV to PO  · Add carafate  · GI evaluation appreciated, EGD  · Diet resumed--stop IV fluids         Bipolar disorder [facility])   Assessment & Plan     · Psychiatric evaluation; hold parameters on clonidine to prevent hypotension      Anemia   Assessment & Plan     · Chronic anemia-- iron panel noted low ferritin and folate levels--replete      Dysuria   Assessment & Plan     · Yesterday requested pyridium for dysuria  NO UTI (normal UA)      H/O self-harm   Assessment & Plan     · Patient reports she cut with glass on her right wrist due to anxiety and pain  Denies any suicidal ideation-psych eval pending  · Renew clonidine Rx-patient reports she ran out one 1 week ago and could not get refill and that is why she started drinking  She states she has been on it for years and it helps her anxiety      Tobacco abuse   Assessment & Plan     · Recommend tobacco cessation and nicotine patch  · Patient with subjective complaints of green phlegm and some chest discomfort with taking a deep breath  However, she has no sepsis criteria, normal lung exam for me for 2 consecutive days without any wheezing, no hypoxia, and normal chest x-ray x2  I do not feel she has any significant pulmonary issues at this time  I would recommend that she quit smoking and we can certainly keep her on prn albuterol and mucinex for possible viral URI           Per GI 1527- 78-year-old female with history of bipolar disorder was admitted because of abdominal pain associated with nausea, vomiting and also reports having dark colored bowel movement  She took 24 shots of Tequila along with Xanax and naproxen at home  She reports having abdominal pain, nausea and vomiting since then  Abdominal pain nausea and vomiting-secondary to recent up alcohol abuse and naproxen  Dark colored bowel movement questionable melena-appear to be most likely from Carol Ann-Reyes tear  Rule out peptic ulcer disease or gastric erosions  -IV Protonix  -Plan for EGD today    Pt seen at GI [facility] in [facility] area  She admits to having productive green sputum for 3-4 days time, and sinus congestion   She has productive cough but no sore throat  She has a hx of reactive airway and asthma as well  Upon auscultation bilateral anterior aspects of lungs, lung sounds are decreased at bases  She is not febrile       Concern with upper respiratory illness (viral vs  Bacterial) vs  Aspiration in setting of asthma  H/H is relatively stable since admission, pt has not admitted to [facility] today or melanotic stools       Discussed case with Dr Kasey Deleon and [facility] (Dr Jocelyne Harris)  Pt may be at risk of bronchospasm under IV sedation given productive green sputum vs  Aspiration process (pt took 24 tequila shots, xanax, and naproxen all at once)     - please hold EGD procedure today  - obtain serial CXR to monitor for aspiration risk  - consider sputum culture to determine if bacterial or viral etiology of respiratory illness/sinusitis  - should pt have melanotic stools and continue to have drop in Hct, EGD may be performed more urgently       Discharge Plan: to be determined, psyche consult is not done yet                      [address] [facility] Review Department  Phone: [phone]; Fax [phone]  [email or web address]  ATTENTION: Please call with any questions or concerns to [phone]  and carefully listen to the prompts so that you are directed to the right person  Send all requests for admission clinical reviews, approved or denied determinations and any other requests to fax [phone]   All voicemails are confidential

## 2018-12-28 NOTE — SOCIAL WORK
CM received VM from [place] CM for [facility] Reji asked that CM call with any discharge planning needs  CM called back and left VM for Mendez ([phone]) and stated that patient is interested in OP [address] services and resources provided
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
## 2018-12-28 NOTE — ASSESSMENT & PLAN NOTE
· Recommend tobacco cessation and nicotine patch  · Patient with subjective complaints of green phlegm and some chest discomfort with taking a deep breath  However, she has no sepsis criteria, normal lung exam for me for 2 consecutive days without any wheezing, no hypoxia, and normal chest x-ray x2  I do not feel she has any significant pulmonary issues at this time    I would recommend that she quit smoking and we can certainly keep her on prn albuterol and mucinex for possible viral URI

## 2018-12-31 NOTE — UTILIZATION REVIEW
Notification of Discharge  This is a Notification of Discharge from our facility [address]  Please be advised that this patient has been discharge from our facility  Below you will find the admission and discharge date and time including the patients disposition  PRESENTATION DATE: 12/26/2018  1:47 PM  IP ADMISSION DATE: 12/26/18 1835  DISCHARGE DATE: 12/28/2018  4:52 PM  DISPOSITION: [id number] Utilization Review Department  Phone: [phone]; Fax [phone]  ATTENTION: Please call with any questions or concerns to [phone]  and carefully listen to the prompts so that you are directed to the right person  Send all requests for admission clinical reviews, approved or denied determinations and any other requests to fax [phone]   All voicemails are confidential

## 2019-01-04 ENCOUNTER — TELEPHONE (OUTPATIENT)
Dept: PSYCHIATRY | Facility: CLINIC | Age: 24
End: 2019-01-04

## 2019-01-04 ENCOUNTER — TELEPHONE (OUTPATIENT)
Dept: INTERNAL MEDICINE CLINIC | Facility: CLINIC | Age: 24
End: 2019-01-04

## 2019-01-04 ENCOUNTER — OFFICE VISIT (OUTPATIENT)
Dept: INTERNAL MEDICINE CLINIC | Facility: CLINIC | Age: 24
End: 2019-01-04
Payer: COMMERCIAL

## 2019-01-04 VITALS
WEIGHT: 109.6 LBS | DIASTOLIC BLOOD PRESSURE: 70 MMHG | BODY MASS INDEX: 18.71 KG/M2 | SYSTOLIC BLOOD PRESSURE: 102 MMHG | OXYGEN SATURATION: 100 % | HEIGHT: 64 IN | TEMPERATURE: 97.9 F | HEART RATE: 87 BPM | RESPIRATION RATE: 18 BRPM

## 2019-01-04 DIAGNOSIS — F60.3 BORDERLINE PERSONALITY DISORDER (HCC): ICD-10-CM

## 2019-01-04 DIAGNOSIS — F41.9 SEVERE ANXIETY: ICD-10-CM

## 2019-01-04 DIAGNOSIS — Z72.0 TOBACCO ABUSE: ICD-10-CM

## 2019-01-04 DIAGNOSIS — F31.60 BIPOLAR AFFECTIVE DISORDER, CURRENT EPISODE MIXED, CURRENT EPISODE SEVERITY UNSPECIFIED (HCC): ICD-10-CM

## 2019-01-04 DIAGNOSIS — Z87.898 HISTORY OF ACUTE ALCOHOL INTOXICATION: ICD-10-CM

## 2019-01-04 DIAGNOSIS — Z76.89 ENCOUNTER TO ESTABLISH CARE WITH NEW DOCTOR: Primary | ICD-10-CM

## 2019-01-04 PROCEDURE — 99214 OFFICE O/P EST MOD 30 MIN: CPT | Performed by: INTERNAL MEDICINE

## 2019-01-04 RX ORDER — LORAZEPAM 1 MG/1
1 TABLET ORAL EVERY 8 HOURS PRN
COMMUNITY
End: 2019-01-04

## 2019-01-04 NOTE — PROGRESS NOTES
Assessment/Plan:     Diagnoses and all orders for this visit:    Encounter to establish care with new doctor    Bipolar affective disorder, current episode mixed, current episode severity unspecified ([facility] [address] )  Comments:  was seeing psychiatry and taking gabapentin, clonidine  we are assisting Olegario Driscoll in re-establishing with psychiatry locally (See below)  Orders:  -     Ambulatory referral to [facility]; Future  -     Ambulatory referral to [facility]; Future    Severe anxiety  Comments:  stable, taking clonidine and gabapentin  advised to avoid alcohol & psych f/u  Orders:  -     Ambulatory referral to [facility]; Future  -     Ambulatory referral to [facility]; Future    Borderline personality disorder [facility])  Comments:  psychiatry appt as above  Orders:  -     Ambulatory referral to [facility]; Future  -     Ambulatory referral to [facility]; Future    Tobacco abuse  Comments:  tob cess counseling discussed    History of acute alcohol intoxication  Comments:  with Upper GI sx, c/w PPI and carafate & advised to contact GI  Olegario Driscoll stated she will call or stop by their [facility] office to schedule    Other orders  -     Discontinue: LORazepam (ATIVAN) 1 mg tablet; Take 1 mg by mouth every 8 (eight) hours as needed for anxiety      My  called several psychiatrist's offices(Dr Yolande Maria, Dr Eliud Jordan in [address] [place] [address]) and rec'd VM & left message for CB   patient did not schedule a f/u appt at check out  Subjective:      Patient ID: Mara Headley is a 21 y o  female  HPI     New to practice here to establish care  Reviewed meds with Olegario Driscoll, she was recently admitted to hospital for acute alcohol intoxication with suspected gastritis  She has a hx of bipolar disorder, severe anxiety, borderline personality disorder & was seeing psychiatry in [place]  She moved to [address] [place] [address] and back to [facility] recently      She currently smokes cigarettes and is ready to quit, was prescribed nicotine patches but reports cannot afford them  She hasn't seen psychiatrist since living in [place] and takes clonidine, gabapentin for anxiety from her previous psychiatrist   She took ativan which was prescribed to her in [place] but not by psychiatrist & requests a refill of this today  [place] PDMP reviewed and no medications listed except for gabapentin(FRED MONTEMAYOR MD reviewed)  She reports hx of asthma which is controlled with prn albuterol and is recovering from a recent cold  Quang Evans hasn't made an appt with GI yet since discharge  She has a history of self-harm/cutting but denies suicidal ideation or intent at this time  No other complaints  Family History   Problem Relation Age of Onset    Hypertension Mother     Depression Mother     Anxiety disorder Mother     Skin cancer Mother    Maria A Belch Melanoma Mother     Depression Father     Anxiety disorder Father     Substance Abuse Father     Alcohol abuse Father     Diabetes Sister     Depression Sister     Anxiety disorder Sister     Asthma Sister     Heart attack Maternal Grandfather     Mental illness Neg Hx      Social History     Social History    Marital status: Single     Spouse name: N/A    Number of children: N/A    Years of education: N/A     Occupational History    Not on file       Social History Main Topics    Smoking status: Current Every Day Smoker     Packs/day: 0 50     Years: 4 00    Smokeless tobacco: Never Used    Alcohol use 1 2 oz/week     2 Standard drinks or equivalent per week    Drug use: No    Sexual activity: Not on file     Other Topics Concern    Not on file     Social History Narrative    No narrative on file     Past Medical History:   Diagnosis Date    Anemia     Asthma     Bipolar 1 disorder ([facility] [address] )     IC (interstitial cystitis)      Vitals:    01/04/19 0953   BP: 102/70   Pulse: 87   Resp: 18   Temp: 97 9 °F (36 6 °C)   TempSrc: Oral   SpO2: 100%   Weight: 49 7 kg (109 lb 9 6 oz) Height: 5' 3 5" (1 613 m)     Body mass index is 19 11 kg/m²      Current Outpatient Prescriptions:     albuterol (PROVENTIL HFA,VENTOLIN HFA) 90 mcg/act inhaler, Inhale 2 puffs every 4 (four) hours as needed for wheezing, Disp: 1 Inhaler, Rfl: 0    cloNIDine (CATAPRES) 0 2 mg tablet, Take 1 tablet (0 2 mg total) by mouth 2 (two) times a day, Disp: 20 tablet, Rfl: 0    ferrous sulfate 325 (65 Fe) mg tablet, Take 1 tablet (325 mg total) by mouth daily with breakfast, Disp: 30 tablet, Rfl: 0    gabapentin (NEURONTIN) 300 mg capsule, Take 1 capsule (300 mg total) by mouth daily And 600 mg po qhs, Disp: 90 capsule, Rfl: 0    gabapentin (NEURONTIN) 600 MG tablet, Take 600 mg by mouth daily at bedtime, Disp: , Rfl:     hydrOXYzine (VISTARIL) 100 MG capsule, Take 100 mg by mouth 4 (four) times a day as needed for itching, Disp: , Rfl:     acetaminophen (TYLENOL) 325 mg tablet, Take 2 tablets (650 mg total) by mouth every 6 (six) hours as needed for mild pain (Patient not taking: Reported on 1/4/2019 ), Disp: 30 tablet, Rfl: 0    ascorbic acid (VITAMIN C) 250 MG tablet, Take 1 tablet (250 mg total) by mouth daily (Patient not taking: Reported on 1/4/2019 ), Disp: 30 tablet, Rfl: 0    docusate sodium (COLACE) 100 mg capsule, Take 1 capsule (100 mg total) by mouth 2 (two) times a day as needed for constipation (Patient not taking: Reported on 1/4/2019 ), Disp: 10 capsule, Rfl: 0    folic acid (FOLVITE) 1 mg tablet, Take 1 tablet (1 mg total) by mouth daily (Patient not taking: Reported on 1/4/2019 ), Disp: 30 tablet, Rfl: 0    guaiFENesin (MUCINEX) 600 mg 12 hr tablet, Take 1 tablet (600 mg total) by mouth every 12 (twelve) hours (Patient not taking: Reported on 1/4/2019 ), Disp: 10 tablet, Rfl: 0    nicotine (NICODERM CQ) 14 mg/24hr TD 24 hr patch, Place 1 patch on the skin every 24 hours (Patient not taking: Reported on 1/4/2019 ), Disp: 28 patch, Rfl: 0    ondansetron (ZOFRAN-ODT) 4 mg disintegrating tablet, Take 1 tablet (4 mg total) by mouth every 6 (six) hours as needed for nausea or vomiting (Patient not taking: Reported on 1/4/2019 ), Disp: 20 tablet, Rfl: 0    pantoprazole (PROTONIX) 40 mg tablet, Take 1 tablet (40 mg total) by mouth 2 (two) times a day before meals (Patient not taking: Reported on 1/4/2019 ), Disp: 60 tablet, Rfl: 0    polyethylene glycol (MIRALAX) 17 g packet, Take 17 g by mouth daily (Patient not taking: Reported on 1/4/2019 ), Disp: 14 each, Rfl: 0    sucralfate (CARAFATE) 1 g/10 mL suspension, Take 10 mL (1,000 mg total) by mouth every 6 (six) hours (Patient not taking: Reported on 1/4/2019 ), Disp: 420 mL, Rfl: 0  Allergies   Allergen Reactions    Penicillins Hives     History reviewed  No pertinent surgical history  Review of Systems   Constitutional: Negative for fever  HENT: Negative for congestion  Eyes: Negative for visual disturbance  Respiratory: Negative for shortness of breath  Cardiovascular: Negative for chest pain  Gastrointestinal: Negative for abdominal pain  Endocrine: Negative for polyuria  Genitourinary: Negative for dysuria  Musculoskeletal: Negative for arthralgias  Skin: Negative for rash  Neurological: Negative for headaches  Psychiatric/Behavioral: Positive for dysphoric mood  The patient is nervous/anxious  Objective:      /70   Pulse 87   Temp 97 9 °F (36 6 °C) (Oral)   Resp 18   Ht 5' 3 5" (1 613 m)   Wt 49 7 kg (109 lb 9 6 oz)   LMP 12/26/2018   SpO2 100%   BMI 19 11 kg/m²          Physical Exam   Constitutional: She appears well-developed and well-nourished  HENT:   Head: Normocephalic and atraumatic  Eyes: Pupils are equal, round, and reactive to light  Conjunctivae are normal    Cardiovascular: Normal rate, regular rhythm and normal heart sounds  No murmur heard  Pulmonary/Chest: Effort normal and breath sounds normal  She has no wheezes  She has no rales  Abdominal: Soft   Bowel sounds are normal  There is no tenderness  Musculoskeletal: She exhibits no edema  Neurological: She is alert  Psychiatric: Her behavior is normal  Her affect is labile  Vitals reviewed

## 2019-01-04 NOTE — Clinical Note
Hi Chatargy  Can your office reach out to [facility] to schedule her for a follow up appt? Katherine Solitario  she was recently hospitalized and was supposed to have an EGD  thx

## 2019-01-08 DIAGNOSIS — K29.21 ACUTE ALCOHOLIC GASTRITIS WITH HEMORRHAGE: ICD-10-CM

## 2019-01-08 RX ORDER — CLONIDINE HYDROCHLORIDE 0.2 MG/1
0.2 TABLET ORAL 2 TIMES DAILY
Qty: 30 TABLET | Refills: 0 | Status: SHIPPED | OUTPATIENT
Start: 2019-01-08 | End: 2019-01-18 | Stop reason: SDUPTHER

## 2019-01-08 NOTE — TELEPHONE ENCOUNTER
We have been trying to reach Kylah(see chart)    Rosy needs to speak with her about her psychiatry appointment

## 2019-01-08 NOTE — TELEPHONE ENCOUNTER
Patient states she is out of the prescription , patient took last of medication yesterday and it wasn't refilled

## 2019-01-10 ENCOUNTER — TELEPHONE (OUTPATIENT)
Dept: PSYCHIATRY | Facility: CLINIC | Age: 24
End: 2019-01-10

## 2019-01-10 NOTE — TELEPHONE ENCOUNTER
Behavorial Health Outpatient Intake Questions    Referred by: Dr Niko Middleton with provider before scheduling    Are there any developmental disabilities? No    Does the patient have hearing impairment? No    Does the patient have ICM or CTT? No    Taking injectable psychiatric medications? NoIf yes, patient can not be seen here  Has the patient ever seen or currently see a psychiatrist? No If yes who/when? Malik Greenberg, 11/2018    Has the patient ever seen or currently see a therapist? Yes If yes who/when? At age 15    How many visits did the pt have for previous psychiatric treatment?  History    Has the patient served in the [place] [address]? No    If yes, have you had combat services? No    Was the patient activated into federal active duty as a member of the national guard or reserve? No    Minor Child    Who has custody of the child? Is there a custody agreement? If there is a custody agreement remind parent that they must bring a copy to the first appt or they will not be seen  [facility]     Presenting Problem (in patient's words)borderline personality dx, bipolar 2, depression, anxiety, med management    Substance Abuse:No concerns of substance abuse are reported  Has the patient been seen here previously, either inpatient or outpatient? No outpatient    If seen as outpatient, what provider(s) did the patient see? A member of the patient's family has been in therapy here with  none  ACCEPTED as a patient Yes Appointment Date: Dr Polly Davis 1/21/19 @ 1:00    Referred Elsewhere?  No    Primary Care Physician: Briseyda Montoya DO    PCP telephone number: [phone] [facility]  ----------------------------------------------------------------------------------------------------------------------    Insurance subscriber:   Vishnu Sheikh MILLY;    Address:                                                        Phone:                                   SSN:    Employer:      [employer]                                                Address:  ----------------------------------------------------------------------------------------------------------------------    Primary Insurance:    [employer]                                                              Phone: [phone]  ID number:   [id number]                                                Group number:[id number]  ----------------------------------------------------------------------------------------------------------------------    Secondary Insurance:                                                               Phone:    ID number:                                                   Group number:  ----------------------------------------------------------------------------------------------------------------------    Other insurance information:             _______________________________________

## 2019-01-13 ENCOUNTER — APPOINTMENT (EMERGENCY)
Dept: CT IMAGING | Facility: HOSPITAL | Age: 24
End: 2019-01-13
Payer: COMMERCIAL

## 2019-01-13 ENCOUNTER — HOSPITAL ENCOUNTER (EMERGENCY)
Facility: HOSPITAL | Age: 24
Discharge: HOME | End: 2019-01-13
Attending: EMERGENCY MEDICINE
Payer: COMMERCIAL

## 2019-01-13 VITALS
DIASTOLIC BLOOD PRESSURE: 62 MMHG | HEART RATE: 58 BPM | RESPIRATION RATE: 16 BRPM | TEMPERATURE: 98.3 F | BODY MASS INDEX: 19.1 KG/M2 | SYSTOLIC BLOOD PRESSURE: 90 MMHG | WEIGHT: 109.57 LBS | OXYGEN SATURATION: 100 %

## 2019-01-13 DIAGNOSIS — R56.9 DRUG WITHDRAWAL SEIZURE WITHOUT COMPLICATION (HCC): Primary | ICD-10-CM

## 2019-01-13 DIAGNOSIS — F19.230 DRUG WITHDRAWAL SEIZURE WITHOUT COMPLICATION (HCC): Primary | ICD-10-CM

## 2019-01-13 LAB
ANION GAP SERPL CALCULATED.3IONS-SCNC: 6 MMOL/L (ref 4–13)
BUN SERPL-MCNC: 8 MG/DL (ref 5–25)
CALCIUM SERPL-MCNC: 9 MG/DL (ref 8.3–10.1)
CHLORIDE SERPL-SCNC: 105 MMOL/L (ref 100–108)
CO2 SERPL-SCNC: 29 MMOL/L (ref 21–32)
CREAT SERPL-MCNC: 0.87 MG/DL (ref 0.6–1.3)
GFR SERPL CREATININE-BSD FRML MDRD: 94 ML/MIN/1.73SQ M
GLUCOSE SERPL-MCNC: 96 MG/DL (ref 65–140)
POTASSIUM SERPL-SCNC: 3.6 MMOL/L (ref 3.5–5.3)
SODIUM SERPL-SCNC: 140 MMOL/L (ref 136–145)

## 2019-01-13 PROCEDURE — 80048 BASIC METABOLIC PNL TOTAL CA: CPT | Performed by: EMERGENCY MEDICINE

## 2019-01-13 PROCEDURE — 70450 CT HEAD/BRAIN W/O DYE: CPT

## 2019-01-13 PROCEDURE — 93005 ELECTROCARDIOGRAM TRACING: CPT

## 2019-01-13 PROCEDURE — 96360 HYDRATION IV INFUSION INIT: CPT

## 2019-01-13 PROCEDURE — 99285 EMERGENCY DEPT VISIT HI MDM: CPT

## 2019-01-13 PROCEDURE — 36415 COLL VENOUS BLD VENIPUNCTURE: CPT | Performed by: EMERGENCY MEDICINE

## 2019-01-13 RX ORDER — MELATONIN 10 MG
20 CAPSULE ORAL
COMMUNITY

## 2019-01-13 RX ORDER — ACETAMINOPHEN 325 MG/1
650 TABLET ORAL ONCE
Status: COMPLETED | OUTPATIENT
Start: 2019-01-13 | End: 2019-01-13

## 2019-01-13 RX ORDER — LORAZEPAM 1 MG/1
1 TABLET ORAL EVERY 8 HOURS PRN
Qty: 20 TABLET | Refills: 0 | Status: SHIPPED | OUTPATIENT
Start: 2019-01-13

## 2019-01-13 RX ADMIN — ACETAMINOPHEN 650 MG: 325 TABLET, FILM COATED ORAL at 21:30

## 2019-01-13 RX ADMIN — SODIUM CHLORIDE 500 ML: 0.9 INJECTION, SOLUTION INTRAVENOUS at 22:12

## 2019-01-14 LAB
ATRIAL RATE: 67 BPM
P AXIS: 44 DEGREES
PR INTERVAL: 130 MS
QRS AXIS: 75 DEGREES
QRSD INTERVAL: 72 MS
QT INTERVAL: 386 MS
QTC INTERVAL: 407 MS
T WAVE AXIS: 63 DEGREES
VENTRICULAR RATE: 67 BPM

## 2019-01-14 PROCEDURE — 93010 ELECTROCARDIOGRAM REPORT: CPT | Performed by: INTERNAL MEDICINE

## 2019-01-14 NOTE — ED PROCEDURE NOTE
PROCEDURE  ECG 12 Lead Documentation  Date/Time: 1/13/2019 9:59 PM  Performed by: Danya Smith  Authorized by: Sarah PAINTER     Indications / Diagnosis:  SZ  ECG reviewed by me, the ED Provider: yes    Patient location:  ED  Previous ECG:     Previous ECG:  Compared to current    Comparison ECG info:  12/26/18    Similarity:  No change    Comparison to cardiac monitor: Yes    Interpretation:     Interpretation: non-specific    Rate:     ECG rate:  67    ECG rate assessment: normal    Rhythm:     Rhythm: sinus rhythm    Ectopy:     Ectopy: none    QRS:     QRS axis:  Normal    QRS intervals:  Normal  Conduction:     Conduction: normal    ST segments:     ST segments:  Normal  T waves:     T waves: flattening      Flattening:  AVL, V1 and V2  Other findings:     Other findings: U wave    Comments:      NO ECG SIGNS OF LONG QTC/ /'WPW/BRUGADA SYNDROME/ HCM /EPSILON WAVES/ R HEART STRAIN         Ailin Sterling MD  01/13/19 1182

## 2019-01-14 NOTE — ED PROVIDER NOTES
History  Chief Complaint   Patient presents with    Seizure - New Onset     pt with no seizure history reports this morning around 0930/10 this am reports that she took her clonidine 0 2mg and hydroxyzine 100mg on an empty stomach, smoked a cigarette and began to feel lighthheaded  states she then started to see and "rainbow like start in my vision that was spinning" she went to walk to kitchen to make herself something and woke up on the cough, boyfriends mom witnessed pt dropped to floor and had seizurelike activity "foamin at the mouth" reports less than than 5 minutes   Head Injury     hit left side of head  also reports mouth/tongue sore s/p seizure     23 YR FEMALE - ABRUPTLY STOPPED BZD 2 DAYS AGO AFTER LONG TERM USE- WAS TOLD BY FRIENDS MOM THAT SHE HAD BRIEF GEN SZ ACTIVITY  TODAY --  LASTED MINUTES- PT WITH LEFT FRONTAL HEAD INJURY - INTRAORAL LAC-- NO FEVERS-- NO OTHER COMPS        History provided by:  Patient   used: No    Seizure - New Onset       Prior to Admission Medications   Prescriptions Last Dose Informant Patient Reported? Taking?    Melatonin 10 MG CAPS   Yes Yes   Sig: Take 20 mg by mouth   acetaminophen (TYLENOL) 325 mg tablet  Self No Yes   Sig: Take 2 tablets (650 mg total) by mouth every 6 (six) hours as needed for mild pain   cloNIDine (CATAPRES) 0 2 mg tablet   No Yes   Sig: Take 1 tablet (0 2 mg total) by mouth 2 (two) times a day *please call office to retrieve message*   ferrous sulfate 325 (65 Fe) mg tablet  Self No Yes   Sig: Take 1 tablet (325 mg total) by mouth daily with breakfast   gabapentin (NEURONTIN) 300 mg capsule  Self No Yes   Sig: Take 1 capsule (300 mg total) by mouth daily And 600 mg po qhs   gabapentin (NEURONTIN) 600 MG tablet  Self Yes Yes   Sig: Take 600 mg by mouth daily at bedtime   guaiFENesin (MUCINEX) 600 mg 12 hr tablet  Self No Yes   Sig: Take 1 tablet (600 mg total) by mouth every 12 (twelve) hours   hydrOXYzine (VISTARIL) 100 MG capsule  Self Yes Yes   Sig: Take 100 mg by mouth 4 (four) times a day as needed for itching   ondansetron (ZOFRAN-ODT) 4 mg disintegrating tablet  Self No Yes   Sig: Take 1 tablet (4 mg total) by mouth every 6 (six) hours as needed for nausea or vomiting      Facility-Administered Medications: None       Past Medical History:   Diagnosis Date    Anemia     Anxiety     Asthma     Bipolar 1 disorder (HCC)     IC (interstitial cystitis)        History reviewed  No pertinent surgical history  Family History   Problem Relation Age of Onset    Hypertension Mother     Depression Mother     Anxiety disorder Mother     Skin cancer Mother    Popeye Lemme Melanoma Mother     Depression Father     Anxiety disorder Father     Substance Abuse Father     Alcohol abuse Father     Diabetes Sister     Depression Sister     Anxiety disorder Sister     Asthma Sister     Heart attack Maternal Grandfather     Mental illness Neg Hx      I have reviewed and agree with the history as documented  Social History   Substance Use Topics    Smoking status: Current Every Day Smoker     Packs/day: 0 50     Years: 4 00    Smokeless tobacco: Never Used    Alcohol use 1 2 oz/week     2 Standard drinks or equivalent per week        Review of Systems   Constitutional: Negative  HENT: Negative  Eyes: Negative  Respiratory: Negative  Cardiovascular: Negative  Gastrointestinal: Negative  Endocrine: Negative  Genitourinary: Negative  Musculoskeletal: Negative  Skin: Negative  Allergic/Immunologic: Negative  Neurological: Positive for seizures  Negative for dizziness, tremors, syncope, facial asymmetry, speech difficulty, weakness, light-headedness, numbness and headaches  Hematological: Negative  Psychiatric/Behavioral: Negative  Physical Exam  Physical Exam   Constitutional: She is oriented to person, place, and time  No distress     AVSS- GREGORY -- PULSE  % ON RA- INTERPRETATION IS NORMAL- NO INTERVENTION    HENT:   Head: Normocephalic  Right Ear: External ear normal    Left Ear: External ear normal    Nose: Nose normal    Mouth/Throat: Oropharynx is clear and moist  No oropharyngeal exudate  LEFT FRONTAL  MID FOREHEAD CONTUSION --     - LEFT INTRAORAL BUCCAL PUNCTURE WOUND- SUPERFICIAL   Eyes: Pupils are equal, round, and reactive to light  Conjunctivae and EOM are normal  Right eye exhibits no discharge  Left eye exhibits no discharge  No scleral icterus  Neck: Normal range of motion  Neck supple  No JVD present  No tracheal deviation present  No thyromegaly present  NO PMT C/T/L/S SPINE   Cardiovascular: Regular rhythm, normal heart sounds and intact distal pulses  Exam reveals no gallop and no friction rub  No murmur heard  Pulmonary/Chest: Effort normal and breath sounds normal  No stridor  No respiratory distress  She has no wheezes  She has no rales  She exhibits no tenderness  Abdominal: Soft  Bowel sounds are normal  She exhibits no distension and no mass  There is no tenderness  There is no rebound and no guarding  No hernia  SOFT NT-ND- NO PERITONEAL SIGNS- NO CVA TENDERNESS   Musculoskeletal: Normal range of motion  She exhibits no edema, tenderness or deformity  EQUAL BILATERAL RADIAL/DP PULSES- NO BLE EDEMA/CALF TENDERNESS/ASSYM/ ERYTHEMA   Lymphadenopathy:     She has no cervical adenopathy  Neurological: She is alert and oriented to person, place, and time  No cranial nerve deficit or sensory deficit  She exhibits normal muscle tone  Coordination normal    Skin: Skin is warm  Capillary refill takes less than 2 seconds  No rash noted  She is not diaphoretic  No erythema  No pallor  LEFT FLEXOR FOREARM- MULTIPLE OLD APPEARING - LINEAR SUPEFICIAL ABRASIONS - NO SIGNS OF INFECTION    Psychiatric: She has a normal mood and affect  Her behavior is normal    Nursing note and vitals reviewed        Vital Signs  ED Triage Vitals   Temperature Pulse Respirations Blood Pressure SpO2   01/13/19 2021 01/13/19 2009 01/13/19 2009 01/13/19 2009 01/13/19 2009   98 3 °F (36 8 °C) 87 18 138/86 100 %      Temp Source Heart Rate Source Patient Position - Orthostatic VS BP Location FiO2 (%)   01/13/19 2021 01/13/19 2009 01/13/19 2009 01/13/19 2009 --   Oral Monitor Sitting Right arm       Pain Score       01/13/19 2029       9           Vitals:    01/13/19 2009 01/13/19 2147 01/13/19 2230   BP: 138/86 98/67 90/62   Pulse: 87 74 58   Patient Position - Orthostatic VS: Sitting Lying Lying       Visual Acuity      ED Medications  Medications   acetaminophen (TYLENOL) tablet 650 mg (650 mg Oral Given 1/13/19 2130)   sodium chloride 0 9 % bolus 500 mL (0 mL Intravenous Stopped 1/13/19 2250)       Diagnostic Studies  Results Reviewed     Procedure Component Value Units Date/Time    Basic metabolic panel [[id number]] Collected:  01/13/19 2127    Lab Status:  Final result Specimen:  Blood from Arm, Right Updated:  01/13/19 2145     Sodium 140 mmol/L      Potassium 3 6 mmol/L      Chloride 105 mmol/L      CO2 29 mmol/L      ANION GAP 6 mmol/L      BUN 8 mg/dL      Creatinine 0 87 mg/dL      Glucose 96 mg/dL      Calcium 9 0 mg/dL      eGFR 94 ml/min/1 73sq m     Narrative:         National Kidney Disease Education Program recommendations are as follows:  GFR calculation is accurate only with a steady state creatinine  Chronic Kidney disease less than 60 ml/min/1 73 sq  meters  Kidney failure less than 15 ml/min/1 73 sq  meters  CT head without contrast   Final Result by Rashad Bills MD (01/13 2149)      No acute intracranial abnormality                    Workstation performed: [id number]                    Procedures  Procedures       Phone Contacts  ED Phone Contact    ED Course                               MDM  CritCare Time    Disposition  Final diagnoses:   Drug withdrawal seizure without complication ([facility] [address] )     Time reflects when diagnosis was documented in both MDM as applicable and the Disposition within this note     Time User Action Codes Description Comment    1/13/2019 10:40 PM Gemini Santillan Add [R15 991,  R56 9] Drug withdrawal seizure without complication [facility])       ED Disposition     ED Disposition Condition Comment    Discharge  Cricket Douglas discharge to home/self care      Condition at discharge: Good        Follow-up Information    None         Discharge Medication List as of 1/13/2019 11:12 PM      START taking these medications    Details   LORazepam (ATIVAN) 1 mg tablet Take 1 tablet (1 mg total) by mouth every 8 (eight) hours as needed for anxiety for up to 20 doses, Starting Sun 1/13/2019, Print         CONTINUE these medications which have NOT CHANGED    Details   acetaminophen (TYLENOL) 325 mg tablet Take 2 tablets (650 mg total) by mouth every 6 (six) hours as needed for mild pain, Starting Fri 12/28/2018, No Print      cloNIDine (CATAPRES) 0 2 mg tablet Take 1 tablet (0 2 mg total) by mouth 2 (two) times a day *please call office to retrieve message*, Starting Tue 1/8/2019, Normal      ferrous sulfate 325 (65 Fe) mg tablet Take 1 tablet (325 mg total) by mouth daily with breakfast, Starting Fri 12/28/2018, Print      gabapentin (NEURONTIN) 300 mg capsule Take 1 capsule (300 mg total) by mouth daily And 600 mg po qhs, Starting Fri 12/28/2018, Print      gabapentin (NEURONTIN) 600 MG tablet Take 600 mg by mouth daily at bedtime, Historical Med      guaiFENesin (MUCINEX) 600 mg 12 hr tablet Take 1 tablet (600 mg total) by mouth every 12 (twelve) hours, Starting Fri 12/28/2018, No Print      hydrOXYzine (VISTARIL) 100 MG capsule Take 100 mg by mouth 4 (four) times a day as needed for itching, Historical Med      Melatonin 10 MG CAPS Take 20 mg by mouth, Historical Med      ondansetron (ZOFRAN-ODT) 4 mg disintegrating tablet Take 1 tablet (4 mg total) by mouth every 6 (six) hours as needed for nausea or vomiting, Starting Fri 12/28/2018, Print           No discharge procedures on file      ED Provider  Electronically Signed by           Eric Vasquez MD  01/14/19 0309

## 2019-01-14 NOTE — ED NOTES
Also reports going off xanax cold turkey on Thursday, usually takes approx 4-5 mg a day      Prema Stewart RN  01/13/19 2013

## 2019-01-14 NOTE — DISCHARGE INSTRUCTIONS
DIAGNOSIS: LIKELY BENZODIAZEPINE WITHDRAWAL SEIZURE    - NEED TO CALL DR DAWSON  [phone] TOMORROW - TO SCHEDULE AN APPOINTMENT TO DISCUSS GRADAL TAPERING OFF THE ATIVAN - VERY IMPORTANT !!!!!!!!!!!!!    - PLEASE RETURN TO   THE ER FOR ANY NEW/ WORSENING/CONCERNING SYMPTOMS TO YOU

## 2019-01-18 ENCOUNTER — OFFICE VISIT (OUTPATIENT)
Dept: INTERNAL MEDICINE CLINIC | Facility: CLINIC | Age: 24
End: 2019-01-18
Payer: COMMERCIAL

## 2019-01-18 VITALS
HEIGHT: 64 IN | RESPIRATION RATE: 18 BRPM | HEART RATE: 109 BPM | DIASTOLIC BLOOD PRESSURE: 84 MMHG | OXYGEN SATURATION: 99 % | WEIGHT: 107.8 LBS | TEMPERATURE: 100.4 F | BODY MASS INDEX: 18.4 KG/M2 | SYSTOLIC BLOOD PRESSURE: 108 MMHG

## 2019-01-18 DIAGNOSIS — J06.9 UPPER RESPIRATORY TRACT INFECTION, UNSPECIFIED TYPE: Primary | ICD-10-CM

## 2019-01-18 DIAGNOSIS — R50.9 FEVER, UNSPECIFIED FEVER CAUSE: ICD-10-CM

## 2019-01-18 DIAGNOSIS — Z72.0 TOBACCO ABUSE: ICD-10-CM

## 2019-01-18 DIAGNOSIS — F41.9 SEVERE ANXIETY: ICD-10-CM

## 2019-01-18 PROCEDURE — 3008F BODY MASS INDEX DOCD: CPT | Performed by: INTERNAL MEDICINE

## 2019-01-18 PROCEDURE — 99214 OFFICE O/P EST MOD 30 MIN: CPT | Performed by: INTERNAL MEDICINE

## 2019-01-18 RX ORDER — DOXYCYCLINE HYCLATE 100 MG/1
100 CAPSULE ORAL EVERY 12 HOURS SCHEDULED
Qty: 20 CAPSULE | Refills: 0 | Status: SHIPPED | OUTPATIENT
Start: 2019-01-18 | End: 2019-01-28

## 2019-01-18 RX ORDER — ALBUTEROL SULFATE 90 UG/1
2 AEROSOL, METERED RESPIRATORY (INHALATION) EVERY 6 HOURS PRN
Qty: 18 G | Refills: 0 | Status: SHIPPED | OUTPATIENT
Start: 2019-01-18

## 2019-01-18 RX ORDER — CLONIDINE HYDROCHLORIDE 0.2 MG/1
0.2 TABLET ORAL 2 TIMES DAILY
Qty: 30 TABLET | Refills: 0 | Status: SHIPPED | OUTPATIENT
Start: 2019-01-18

## 2019-01-18 RX ORDER — HYDROXYZINE PAMOATE 100 MG/1
100 CAPSULE ORAL 4 TIMES DAILY PRN
Qty: 30 CAPSULE | Refills: 0 | Status: SHIPPED | OUTPATIENT
Start: 2019-01-18

## 2019-01-18 RX ORDER — IBUPROFEN 400 MG/1
400 TABLET ORAL ONCE
Status: COMPLETED | OUTPATIENT
Start: 2019-01-18 | End: 2019-01-18

## 2019-01-18 RX ADMIN — IBUPROFEN 400 MG: 400 TABLET ORAL at 10:51
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
## 2024-08-27 ENCOUNTER — HOSPITAL ENCOUNTER (EMERGENCY)
Facility: HOSPITAL | Age: 29
Discharge: HOME | End: 2024-08-28
Attending: EMERGENCY MEDICINE
Payer: COMMERCIAL

## 2024-08-27 VITALS
DIASTOLIC BLOOD PRESSURE: 65 MMHG | SYSTOLIC BLOOD PRESSURE: 109 MMHG | HEART RATE: 66 BPM | RESPIRATION RATE: 18 BRPM | OXYGEN SATURATION: 100 %

## 2024-08-27 DIAGNOSIS — S61.411A LACERATION OF RIGHT HAND WITHOUT FOREIGN BODY, INITIAL ENCOUNTER: ICD-10-CM
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
## 2024-08-27 PROCEDURE — 12002 RPR S/N/AX/GEN/TRNK2.6-7.5CM: CPT | Performed by: EMERGENCY MEDICINE

## 2024-08-27 PROCEDURE — 99284 EMERGENCY DEPT VISIT MOD MDM: CPT | Performed by: EMERGENCY MEDICINE

## 2024-08-27 PROCEDURE — 99283 EMERGENCY DEPT VISIT LOW MDM: CPT

## 2024-08-27 RX ORDER — LIDOCAINE HYDROCHLORIDE AND EPINEPHRINE 10; 10 MG/ML; UG/ML
10 INJECTION, SOLUTION INFILTRATION; PERINEURAL ONCE
Status: COMPLETED | OUTPATIENT
Start: 2024-08-28 | End: 2024-08-27

## 2024-08-27 RX ORDER — SULFAMETHOXAZOLE/TRIMETHOPRIM 800-160 MG
2 TABLET ORAL ONCE
Status: COMPLETED | OUTPATIENT
Start: 2024-08-28 | End: 2024-08-27

## 2024-08-27 RX ORDER — GINSENG 100 MG
1 CAPSULE ORAL ONCE
Status: COMPLETED | OUTPATIENT
Start: 2024-08-28 | End: 2024-08-27

## 2024-08-27 RX ORDER — CLINDAMYCIN HCL 150 MG
450 CAPSULE ORAL ONCE
Status: COMPLETED | OUTPATIENT
Start: 2024-08-28 | End: 2024-08-27

## 2024-08-27 RX ADMIN — LIDOCAINE HYDROCHLORIDE,EPINEPHRINE BITARTRATE 10 ML: 10; .01 INJECTION, SOLUTION INFILTRATION; PERINEURAL at 23:53

## 2024-08-27 RX ADMIN — SULFAMETHOXAZOLE AND TRIMETHOPRIM 2 TABLET: 800; 160 TABLET ORAL at 23:53

## 2024-08-27 RX ADMIN — BACITRACIN ZINC 1 SMALL APPLICATION: 500 OINTMENT TOPICAL at 23:53

## 2024-08-27 RX ADMIN — CLINDAMYCIN HYDROCHLORIDE 450 MG: 150 CAPSULE ORAL at 23:53

## 2024-08-28 ENCOUNTER — APPOINTMENT (EMERGENCY)
Dept: RADIOLOGY | Facility: HOSPITAL | Age: 29
End: 2024-08-28
Payer: COMMERCIAL

## 2024-08-28 PROCEDURE — 73130 X-RAY EXAM OF HAND: CPT

## 2024-08-28 RX ORDER — CLINDAMYCIN HCL 150 MG
450 CAPSULE ORAL 3 TIMES DAILY
Qty: 63 CAPSULE | Refills: 0 | Status: SHIPPED | OUTPATIENT
Start: 2024-08-28 | End: 2024-08-28

## 2024-08-28 RX ORDER — SULFAMETHOXAZOLE/TRIMETHOPRIM 800-160 MG
2 TABLET ORAL 2 TIMES DAILY
Qty: 28 TABLET | Refills: 0 | Status: SHIPPED | OUTPATIENT
Start: 2024-08-28 | End: 2024-09-04

## 2024-08-28 RX ORDER — SULFAMETHOXAZOLE/TRIMETHOPRIM 800-160 MG
2 TABLET ORAL 2 TIMES DAILY
Qty: 28 TABLET | Refills: 0 | Status: SHIPPED | OUTPATIENT
Start: 2024-08-28 | End: 2024-08-28

## 2024-08-28 RX ORDER — SULFAMETHOXAZOLE/TRIMETHOPRIM 800-160 MG
2 TABLET ORAL 2 TIMES DAILY
Qty: 28 TABLET | Refills: 0 | OUTPATIENT
Start: 2024-08-28 | End: 2024-08-28

## 2024-08-28 RX ORDER — OXYCODONE AND ACETAMINOPHEN 5; 325 MG/1; MG/1
1 TABLET ORAL ONCE
Status: COMPLETED | OUTPATIENT
Start: 2024-08-28 | End: 2024-08-28

## 2024-08-28 RX ORDER — CLINDAMYCIN HCL 150 MG
450 CAPSULE ORAL 3 TIMES DAILY
Qty: 63 CAPSULE | Refills: 0 | OUTPATIENT
Start: 2024-08-28 | End: 2024-08-28

## 2024-08-28 RX ORDER — CLINDAMYCIN HCL 150 MG
450 CAPSULE ORAL 3 TIMES DAILY
Qty: 63 CAPSULE | Refills: 0 | Status: SHIPPED | OUTPATIENT
Start: 2024-08-28 | End: 2024-09-04

## 2024-08-28 RX ADMIN — OXYCODONE HYDROCHLORIDE AND ACETAMINOPHEN 1 TABLET: 5; 325 TABLET ORAL at 01:14

## 2024-08-28 NOTE — ED PROVIDER NOTES
History  Chief Complaint   Patient presents with    Animal Bite     Bite by a [place] watters 2hrs ago.  Bleeding controlled.     29-year-old female presenting to ED today for dog bite.  Patient was bit by a Persian Aguilar about 2 hours ago.  She was bit in the right hand.  Bleeding controlled.  She does not think that any of the dog teeth went into her head.  She is having pain at the dog bites.        Prior to Admission Medications   Prescriptions Last Dose Informant Patient Reported? Taking?   LORazepam (ATIVAN) 1 mg tablet  Self No No   Sig: Take 1 tablet (1 mg total) by mouth every 8 (eight) hours as needed for anxiety for up to 20 doses   Melatonin 10 MG CAPS  Self Yes No   Sig: Take 20 mg by mouth   acetaminophen (TYLENOL) 325 mg tablet  Self No No   Sig: Take 2 tablets (650 mg total) by mouth every 6 (six) hours as needed for mild pain   albuterol (VENTOLIN HFA) 90 mcg/act inhaler   No No   Sig: Inhale 2 puffs every 6 (six) hours as needed for wheezing   cloNIDine (CATAPRES) 0.2 mg tablet   No No   Sig: Take 1 tablet (0.2 mg total) by mouth 2 (two) times a day   ferrous sulfate 325 (65 Fe) mg tablet  Self No No   Sig: Take 1 tablet (325 mg total) by mouth daily with breakfast   gabapentin (NEURONTIN) 300 mg capsule  Self No No   Sig: Take 1 capsule (300 mg total) by mouth daily And 600 mg po qhs   gabapentin (NEURONTIN) 600 MG tablet  Self Yes No   Sig: Take 600 mg by mouth daily at bedtime   guaiFENesin (MUCINEX) 600 mg 12 hr tablet  Self No No   Sig: Take 1 tablet (600 mg total) by mouth every 12 (twelve) hours   hydrOXYzine (VISTARIL) 100 MG capsule   No No   Sig: Take 1 capsule (100 mg total) by mouth 4 (four) times a day as needed for anxiety   ondansetron (ZOFRAN-ODT) 4 mg disintegrating tablet  Self No No   Sig: Take 1 tablet (4 mg total) by mouth every 6 (six) hours as needed for nausea or vomiting      Facility-Administered Medications: None       Past Medical History:   Diagnosis Date    Anemia      Anxiety     Asthma     Bipolar 1 disorder (HCC)     IC (interstitial cystitis)        History reviewed. No pertinent surgical history.    Family History   Problem Relation Age of Onset    Hypertension Mother     Depression Mother     Anxiety disorder Mother     Skin cancer Mother     Melanoma Mother     Depression Father     Anxiety disorder Father     Substance Abuse Father     Alcohol abuse Father     Diabetes Sister     Depression Sister     Anxiety disorder Sister     Asthma Sister     Heart attack Maternal Grandfather     Mental illness Neg Hx      I have reviewed and agree with the history as documented.    E-Cigarette/Vaping     E-Cigarette/Vaping Substances     Social History     Tobacco Use    Smoking status: Every Day     Current packs/day: 0.50     Average packs/day: 0.5 packs/day for 4.0 years (2.0 ttl pk-yrs)     Types: Cigarettes    Smokeless tobacco: Never   Substance Use Topics    Alcohol use: Yes     Alcohol/week: 2.0 standard drinks of alcohol     Types: 2 Standard drinks or equivalent per week    Drug use: No       Review of Systems   Constitutional:  Negative for chills and fever.   HENT:  Negative for hearing loss.    Eyes:  Negative for visual disturbance.   Respiratory:  Negative for shortness of breath.    Cardiovascular:  Negative for chest pain.   Gastrointestinal:  Negative for abdominal pain, constipation, diarrhea, nausea and vomiting.   Genitourinary:  Negative for difficulty urinating.   Musculoskeletal:  Negative for myalgias.   Skin:  Negative for color change.   Neurological:  Negative for dizziness and headaches.   Psychiatric/Behavioral:  Negative for agitation.    All other systems reviewed and are negative.      Physical Exam  Physical Exam  Vitals and nursing note reviewed.   Constitutional:       General: She is not in acute distress.     Appearance: Normal appearance. She is well-developed. She is not ill-appearing.   HENT:      Head: Normocephalic and atraumatic.      Right  Ear: External ear normal.      Left Ear: External ear normal.      Nose: Nose normal.      Mouth/Throat:      Mouth: Mucous membranes are moist.      Pharynx: Oropharynx is clear. No oropharyngeal exudate.   Eyes:      General:         Right eye: No discharge.         Left eye: No discharge.      Extraocular Movements: Extraocular movements intact.      Conjunctiva/sclera: Conjunctivae normal.      Pupils: Pupils are equal, round, and reactive to light.   Cardiovascular:      Rate and Rhythm: Normal rate and regular rhythm.      Heart sounds: Normal heart sounds. No murmur heard.     No friction rub. No gallop.   Pulmonary:      Effort: Pulmonary effort is normal. No respiratory distress.      Breath sounds: Normal breath sounds. No stridor. No wheezing.   Abdominal:      General: Bowel sounds are normal. There is no distension.      Palpations: Abdomen is soft.      Tenderness: There is no abdominal tenderness.   Musculoskeletal:         General: No swelling. Normal range of motion.      Cervical back: Normal range of motion and neck supple. No rigidity.      Comments: Extensive dog bite noted to the right hand.  She still has range of motion of the right hand.  Sensation is intact.  She has 1 small half centimeter laceration on the dorsum of the right hand.  She also has a 3 cm laceration in between the fifth and fourth digit.  She also has a 3 cm laceration on the palmar surface underneath the fourth and fifth digit.   Skin:     General: Skin is warm and dry.      Capillary Refill: Capillary refill takes less than 2 seconds.   Neurological:      General: No focal deficit present.      Mental Status: She is alert and oriented to person, place, and time. Mental status is at baseline.      Motor: No weakness.      Gait: Gait normal.   Psychiatric:         Mood and Affect: Mood normal.         Behavior: Behavior normal.         Vital Signs  ED Triage Vitals [08/27/24 2347]   Temp Pulse Respirations Blood Pressure  SpO2   -- 66 18 109/65 100 %      Temp src Heart Rate Source Patient Position - Orthostatic VS BP Location FiO2 (%)   -- Monitor Lying Right arm --      Pain Score       10 - Worst Possible Pain           Vitals:    08/27/24 2347   BP: 109/65   Pulse: 66   Patient Position - Orthostatic VS: Lying         Visual Acuity      ED Medications  Medications   lidocaine-epinephrine (XYLOCAINE/EPINEPHRINE) 1 %-1:100,000 injection 10 mL (10 mL Infiltration Given 8/27/24 2353)   clindamycin (CLEOCIN) capsule 450 mg (450 mg Oral Given 8/27/24 2353)   sulfamethoxazole-trimethoprim (BACTRIM DS) 800-160 mg per tablet 2 tablet (2 tablets Oral Given 8/27/24 2353)   bacitracin topical ointment 1 small application (1 small application Topical Given 8/27/24 2353)   oxyCODONE-acetaminophen (PERCOCET) 5-325 mg per tablet 1 tablet (1 tablet Oral Given 8/28/24 0114)       Diagnostic Studies  Results Reviewed       None                   XR hand 3+ views RIGHT    (Results Pending)              Procedures  Universal Protocol:  procedure performed by consultantConsent: Verbal consent obtained.  Risks and benefits: risks, benefits and alternatives were discussed  Consent given by: patient  Patient understanding: patient states understanding of the procedure being performed  Patient identity confirmed: verbally with patient and arm band  Laceration repair    Date/Time: 8/28/2024 12:34 AM    Performed by: Grabiel Lorenzo MD  Authorized by: Grabiel Lorenzo MD  Body area: upper extremity  Location details: right hand  Wound length (cm): There are 3 separate lacerations.  1 is 3 cm the other 1 is 3 cm and the last 1 is half a centimeter totaling 6 cm and a half.  Foreign bodies: no foreign bodies  Tendon involvement: none  Nerve involvement: none  Anesthesia: local infiltration    Anesthesia:  Local Anesthetic: lidocaine 1% with epinephrine  Anesthetic total: 4 mL    Sedation:  Patient sedated: no        Procedure Details:  Preparation: Patient was  prepped and draped in the usual sterile fashion.  Irrigation solution: saline  Irrigation method: syringe  Amount of cleaning: standard  Debridement: none  Degree of undermining: none  Skin closure: 5-0 Prolene  Number of sutures: 10 (The palmar surface needed 5 sutures.  The 1 in between the fingers required 4 sutures and there is 1 suture on the dorsum of the hand.)  Technique: simple  Approximation: close  Approximation difficulty: complex  Dressing: antibiotic ointment, non-adhesive packing strip and gauze roll  Patient tolerance: patient tolerated the procedure well with no immediate complications               ED Course  ED Course as of 08/28/24 0116   Tue Aug 27, 2024   2351 Last tdap 2018     Wed Aug 28, 2024   0105 4 + 1 + 5 stitches                                 SBIRT 22yo+      Flowsheet Row Most Recent Value   Initial Alcohol Screen: US AUDIT-C     1. How often do you have a drink containing alcohol? 0 Filed at: 08/27/2024 2346   2. How many drinks containing alcohol do you have on a typical day you are drinking?  0 Filed at: 08/27/2024 2346   3b. FEMALE Any Age, or MALE 65+: How often do you have 4 or more drinks on one occassion? 0 Filed at: 08/27/2024 2346   Audit-C Score 0 Filed at: 08/27/2024 2346   LISETTE: How many times in the past year have you...    Used an illegal drug or used a prescription medication for non-medical reasons? Never Filed at: 08/27/2024 2346                      Medical Decision Making  29-year-old female presenting to ED today after dog bite.  See procedure note for details of repair.  10 stitches required in total.  She has an allergy to penicillins will place her on clindamycin and Bactrim.  Will give one-time dose of Percocet for pain.  Encouraged outpatient follow-up with primary care provider.  Encouraged return in 7 to 9 days for suture removal.  Return precautions and signs of infection discussed with patient and patient was discharged home.    Amount and/or Complexity  of Data Reviewed  Radiology: ordered.    Risk  OTC drugs.  Prescription drug management.                 Disposition  Final diagnoses:   Dog bite, initial encounter   Laceration of right hand without foreign body, initial encounter     Time reflects when diagnosis was documented in both MDM as applicable and the Disposition within this note       Time User Action Codes Description Comment    8/28/2024  1:07 AM Grabiel Lorenzo Add [W54.0XXA] Dog bite, initial encounter     8/28/2024  1:07 AM Grabiel Lorenzo Add [S61.411A] Laceration of right hand without foreign body, initial encounter           ED Disposition       ED Disposition   Discharge    Condition   Stable    Date/Time   Wed Aug 28, 2024 0107    Comment   Kylah Gustabo discharge to home/self care.                   Follow-up Information       Follow up With Specialties Details Why Contact Info Additional Information    Gen Li,  Internal Medicine Schedule an appointment as soon as possible for a visit  for follow up [address] [place] [id number]  [phone]       Critical access hospital Emergency Department Emergency Medicine Go to  If symptoms worsen, As needed [address] [facility] [id number] [phone] [facility] Department, [address] [place], [place], [address]            Current Discharge Medication List        START taking these medications    Details   clindamycin (CLEOCIN) 150 mg capsule Take 3 capsules (450 mg total) by mouth 3 (three) times a day for 7 days  Qty: 63 capsule, Refills: 0    Associated Diagnoses: Laceration of right hand without foreign body, initial encounter      sulfamethoxazole-trimethoprim (BACTRIM DS) 800-160 mg per tablet Take 2 tablets by mouth 2 (two) times a day for 7 days smx-tmp DS (BACTRIM) 800-160 mg tabs (1tab q12 D10)  Qty: 28 tablet, Refills: 0    Associated Diagnoses: Laceration of right hand without foreign body, initial encounter            CONTINUE these medications which have NOT CHANGED    Details   acetaminophen (TYLENOL) 325 mg tablet Take 2 tablets (650 mg total) by mouth every 6 (six) hours as needed for mild pain  Qty: 30 tablet, Refills: 0    Associated Diagnoses: Acute alcoholic gastritis with hemorrhage      albuterol (VENTOLIN HFA) 90 mcg/act inhaler Inhale 2 puffs every 6 (six) hours as needed for wheezing  Qty: 18 g, Refills: 0    Associated Diagnoses: Upper respiratory tract infection, unspecified type; Tobacco abuse      cloNIDine (CATAPRES) 0.2 mg tablet Take 1 tablet (0.2 mg total) by mouth 2 (two) times a day  Qty: 30 tablet, Refills: 0    Associated Diagnoses: Severe anxiety      ferrous sulfate 325 (65 Fe) mg tablet Take 1 tablet (325 mg total) by mouth daily with breakfast  Qty: 30 tablet, Refills: 0    Associated Diagnoses: Acute alcoholic gastritis with hemorrhage      gabapentin (NEURONTIN) 300 mg capsule Take 1 capsule (300 mg total) by mouth daily And 600 mg po qhs  Qty: 90 capsule, Refills: 0    Associated Diagnoses: Acute alcoholic gastritis with hemorrhage      gabapentin (NEURONTIN) 600 MG tablet Take 600 mg by mouth daily at bedtime      guaiFENesin (MUCINEX) 600 mg 12 hr tablet Take 1 tablet (600 mg total) by mouth every 12 (twelve) hours  Qty: 10 tablet, Refills: 0    Associated Diagnoses: Acute alcoholic gastritis with hemorrhage      hydrOXYzine (VISTARIL) 100 MG capsule Take 1 capsule (100 mg total) by mouth 4 (four) times a day as needed for anxiety  Qty: 30 capsule, Refills: 0    Associated Diagnoses: Severe anxiety      LORazepam (ATIVAN) 1 mg tablet Take 1 tablet (1 mg total) by mouth every 8 (eight) hours as needed for anxiety for up to 20 doses  Qty: 20 tablet, Refills: 0    Associated Diagnoses: Drug withdrawal seizure without complication (HCC)      Melatonin 10 MG CAPS Take 20 mg by mouth      ondansetron (ZOFRAN-ODT) 4 mg disintegrating tablet Take 1 tablet (4 mg total) by mouth every 6 (six) hours as  needed for nausea or vomiting  Qty: 20 tablet, Refills: 0    Associated Diagnoses: Acute alcoholic gastritis with hemorrhage             No discharge procedures on file.    PDMP Review       None            ED Provider  Electronically Signed by             Grabiel Lorenzo MD  08/28/24 0110

## 2024-08-28 NOTE — DISCHARGE INSTRUCTIONS
You are seen in the ER today for your dog bite.  You had 10 stitches in your hand.  Please keep them dry for the next 24 hours.  After that please clean with soap and water but do not scrub.  Only pat dry and pat clean.  You will be on 2 antibiotics.  Please take as prescribed.  It will be clindamycin 3 capsules 3 times a day for the next of days.  You will take 2 tablets of the Bactrim twice a day for the next 7 days as well.    Monitor your wound for signs of infection such as redness, pus draining from the wound or fever.  Please return if you notice any of the signs.  You will need suture removal in 7 to 10 days.  You can either return here or go to urgent care.